# Patient Record
Sex: MALE | Race: OTHER | Employment: OTHER | URBAN - METROPOLITAN AREA
[De-identification: names, ages, dates, MRNs, and addresses within clinical notes are randomized per-mention and may not be internally consistent; named-entity substitution may affect disease eponyms.]

---

## 2017-01-19 ENCOUNTER — OFFICE VISIT (OUTPATIENT)
Dept: FAMILY MEDICINE CLINIC | Facility: CLINIC | Age: 72
End: 2017-01-19

## 2017-01-19 VITALS
OXYGEN SATURATION: 98 % | TEMPERATURE: 100 F | SYSTOLIC BLOOD PRESSURE: 110 MMHG | HEIGHT: 67 IN | HEART RATE: 71 BPM | DIASTOLIC BLOOD PRESSURE: 70 MMHG | RESPIRATION RATE: 18 BRPM | WEIGHT: 151 LBS | BODY MASS INDEX: 23.7 KG/M2

## 2017-01-19 DIAGNOSIS — J01.40 ACUTE PANSINUSITIS, RECURRENCE NOT SPECIFIED: Primary | ICD-10-CM

## 2017-01-19 PROCEDURE — 99213 OFFICE O/P EST LOW 20 MIN: CPT | Performed by: NURSE PRACTITIONER

## 2017-01-19 RX ORDER — AZITHROMYCIN 250 MG/1
TABLET, FILM COATED ORAL
Qty: 6 TABLET | Refills: 0 | Status: SHIPPED | OUTPATIENT
Start: 2017-01-19 | End: 2017-03-29

## 2017-01-20 NOTE — PATIENT INSTRUCTIONS
Humidifier in room  Sleep propped  Push fluids  Limit dairy  Mucinex as directed  Sudafed as needed  Continue Flonase and Albuterol as needed      Sinusitis (Antibiotic Treatment)    The sinuses are air-filled spaces within the bones of the face.  They co · Over-the-counter antihistamines may help if allergies contributed to your sinusitis.    · Do not use nasal rinses or irrigation during an acute sinus infection, unless told to by your health care provider.  Rinsing may spread the infection to other sinuse

## 2017-01-20 NOTE — PROGRESS NOTES
CHIEF COMPLAINT:   Patient presents with:  Cough: fatigue, bodyaches, sinus pain, congestion x 1 week       HPI:   Blanka Saravia is a 67year old male who presents for cold symptoms for  1  weeks.  Symptoms have progressed into sinus congestion and been wo Family History   Problem Relation Age of Onset   • Diabetes Mother         Smoking Status: Never Smoker                      Smokeless Status: Never Used                        Alcohol Use: No                  REVIEW OF SYSTEMS:   GENERAL:  No change in ap Risks, benefits, side effects of medication addressed and explained.     Patient Instructions     Humidifier in room  Sleep propped  Push fluids  Limit dairy  Mucinex as directed  Sudafed as needed  Continue Flonase and Albuterol as needed      Sinusitis (A · Over-the-counter decongestants may be used unless a similar medicine was prescribed. Nasal sprays work the fastest. Use one that contains phenylephrine or oxymetazoline. First blow the nose gently. Then use the spray.  Do not use these medicines more ofte © 1310-5820 The [address] [place] [address], [address]. All rights reserved. This information is not intended as a substitute for professional medical care. Always follow your healthcare professional's instructions.             The

## 2017-03-29 ENCOUNTER — OFFICE VISIT (OUTPATIENT)
Dept: FAMILY MEDICINE CLINIC | Facility: CLINIC | Age: 72
End: 2017-03-29

## 2017-03-29 VITALS
WEIGHT: 151 LBS | SYSTOLIC BLOOD PRESSURE: 114 MMHG | HEART RATE: 88 BPM | RESPIRATION RATE: 12 BRPM | DIASTOLIC BLOOD PRESSURE: 62 MMHG | BODY MASS INDEX: 24 KG/M2 | OXYGEN SATURATION: 99 % | TEMPERATURE: 98 F

## 2017-03-29 DIAGNOSIS — Z02.9 ENCOUNTERS FOR ADMINISTRATIVE PURPOSE: Primary | ICD-10-CM

## 2017-03-29 NOTE — PROGRESS NOTES
Patient complaining of left shoulder pain and decreased ROM x10-12 days. No injury, reports slept on a stiff surface and has had pain since. Denies numbness/tingling in hand or weakness.   Patient has been taking diclofenac for symptoms w/out relief and a

## 2017-03-31 ENCOUNTER — OFFICE VISIT (OUTPATIENT)
Dept: FAMILY MEDICINE CLINIC | Facility: CLINIC | Age: 72
End: 2017-03-31

## 2017-03-31 VITALS
RESPIRATION RATE: 16 BRPM | HEART RATE: 72 BPM | BODY MASS INDEX: 23.7 KG/M2 | HEIGHT: 67 IN | SYSTOLIC BLOOD PRESSURE: 128 MMHG | WEIGHT: 151 LBS | DIASTOLIC BLOOD PRESSURE: 66 MMHG

## 2017-03-31 DIAGNOSIS — M25.512 ACUTE PAIN OF LEFT SHOULDER: Primary | ICD-10-CM

## 2017-03-31 DIAGNOSIS — M54.2 NECK PAIN ON LEFT SIDE: ICD-10-CM

## 2017-03-31 PROCEDURE — 99214 OFFICE O/P EST MOD 30 MIN: CPT | Performed by: FAMILY MEDICINE

## 2017-03-31 RX ORDER — DICLOFENAC SODIUM 75 MG/1
75 TABLET, DELAYED RELEASE ORAL 2 TIMES DAILY
Qty: 60 TABLET | Refills: 2 | Status: SHIPPED | OUTPATIENT
Start: 2017-03-31 | End: 2018-08-29

## 2017-03-31 RX ORDER — CYCLOBENZAPRINE HCL 10 MG
10 TABLET ORAL 3 TIMES DAILY
Qty: 30 TABLET | Refills: 1 | Status: SHIPPED | OUTPATIENT
Start: 2017-03-31 | End: 2017-04-20

## 2017-03-31 NOTE — PROGRESS NOTES
Dennys Anderson is a 67year old male here for Patient presents with:  Shoulder Pain: Left shoulder pain on & off for years, started again a couple weeks ago       HPI:     Left shoulder pain  -has had off and on for years  -worked as a   -2 wks ago, strain  -start home exercises  -diclofenac as needed  -cyclobenzaprine prn severe pain - may cause drowsiness  -consider PT +/- injection if not improving  -f/u prn        Meds This Visit:    Signed Prescriptions Disp Refills    Diclofenac Sodium 75 MG Ora

## 2017-03-31 NOTE — PATIENT INSTRUCTIONS
-- start diclofenac 2x/day with food for next 2-3 days  -- exercises for 5-10min 2-3x/day  -- heat/ice as needed  -- if not improving, try to find a physical therapy location in [facility] and check with insurance regarding out of state coverage  -- call wit

## 2018-05-25 ENCOUNTER — PATIENT OUTREACH (OUTPATIENT)
Dept: FAMILY MEDICINE CLINIC | Facility: CLINIC | Age: 73
End: 2018-05-25

## 2018-07-25 ENCOUNTER — OFFICE VISIT (OUTPATIENT)
Dept: FAMILY MEDICINE CLINIC | Facility: CLINIC | Age: 73
End: 2018-07-25
Payer: MEDICARE

## 2018-07-25 VITALS
BODY MASS INDEX: 24.01 KG/M2 | HEART RATE: 86 BPM | WEIGHT: 153 LBS | DIASTOLIC BLOOD PRESSURE: 66 MMHG | SYSTOLIC BLOOD PRESSURE: 132 MMHG | HEIGHT: 67 IN

## 2018-07-25 DIAGNOSIS — M75.01 ADHESIVE CAPSULITIS OF RIGHT SHOULDER: ICD-10-CM

## 2018-07-25 DIAGNOSIS — G89.29 CHRONIC RIGHT SHOULDER PAIN: Primary | ICD-10-CM

## 2018-07-25 DIAGNOSIS — M25.511 CHRONIC RIGHT SHOULDER PAIN: Primary | ICD-10-CM

## 2018-07-25 PROCEDURE — 99215 OFFICE O/P EST HI 40 MIN: CPT | Performed by: FAMILY MEDICINE

## 2018-07-25 PROCEDURE — 20610 DRAIN/INJ JOINT/BURSA W/O US: CPT | Performed by: FAMILY MEDICINE

## 2018-07-25 RX ORDER — NAPROXEN 500 MG/1
500 TABLET ORAL 2 TIMES DAILY WITH MEALS
Qty: 60 TABLET | Refills: 2 | Status: SHIPPED | OUTPATIENT
Start: 2018-07-25 | End: 2018-08-29

## 2018-07-25 RX ORDER — TRIAMCINOLONE ACETONIDE 40 MG/ML
40 INJECTION, SUSPENSION INTRA-ARTICULAR; INTRAMUSCULAR ONCE
Status: COMPLETED | OUTPATIENT
Start: 2018-07-25 | End: 2018-07-25

## 2018-07-25 RX ORDER — DICLOFENAC SODIUM 75 MG/1
75 TABLET, DELAYED RELEASE ORAL 2 TIMES DAILY
Qty: 60 TABLET | Refills: 2 | Status: CANCELLED | OUTPATIENT
Start: 2018-07-25

## 2018-07-25 NOTE — PATIENT INSTRUCTIONS
-- naproxen 2x/day as needed, take with food  -- can try OTC lidocaine pain patch as needed  -- ice as needed  -- exercises  -- call to schedule PT next week  -- followup in 4 wks, sooner if needed

## 2018-07-25 NOTE — PROGRESS NOTES
Dunia Pettit is a 75year old male here for Patient presents with:  Shoulder Pain: Right shoulder consistant pain x 1 year, worse the last month and a half      HPI:       1. Chronic right shoulder pain  2.  Adhesive capsulitis of right shoulder  -started can test and impingement testing  Psych: normal affect     ASSESSMENT/PLAN:     1. Chronic right shoulder pain  2.  Adhesive capsulitis of right shoulder  -likely rotator cuff pathology with adhesive capsulitis  -worsening  -counseled at length regarding ca

## 2018-08-01 ENCOUNTER — OFFICE VISIT (OUTPATIENT)
Dept: PHYSICAL THERAPY | Age: 73
End: 2018-08-01
Attending: FAMILY MEDICINE
Payer: MEDICARE

## 2018-08-01 DIAGNOSIS — M25.511 CHRONIC RIGHT SHOULDER PAIN: ICD-10-CM

## 2018-08-01 DIAGNOSIS — M75.01 ADHESIVE CAPSULITIS OF RIGHT SHOULDER: ICD-10-CM

## 2018-08-01 DIAGNOSIS — G89.29 CHRONIC RIGHT SHOULDER PAIN: ICD-10-CM

## 2018-08-01 PROCEDURE — 97161 PT EVAL LOW COMPLEX 20 MIN: CPT

## 2018-08-01 PROCEDURE — 97110 THERAPEUTIC EXERCISES: CPT

## 2018-08-01 NOTE — PROGRESS NOTES
UPPER EXTREMITY EVALUATION:   Referring Physician: Dr. Ashlee Jackson  Diagnosis: Chronic right shoulder pain and adhesive capsulitis.    Date of Service: 8/1/2018     PATIENT [address] [place] [address] is a 75year old y/o male who presents to therapy today with comp understanding and agreement. Pt performs HEP without any increases in pain but needs VC on proper control and following he performs correctly. It is medically necessary for pt to continue PT to reach functional goals.        Precautions:  None  OBJECTIVE: agreed to actively participate in planning and for this course of care. Thank you for your referral. Please co-sign or sign and return this letter via fax as soon as possible to [phone].  If you have any questions, please contact me at Dept: [phone]

## 2018-08-06 ENCOUNTER — OFFICE VISIT (OUTPATIENT)
Dept: PHYSICAL THERAPY | Age: 73
End: 2018-08-06
Attending: FAMILY MEDICINE
Payer: MEDICARE

## 2018-08-06 PROCEDURE — 97110 THERAPEUTIC EXERCISES: CPT

## 2018-08-06 PROCEDURE — 97140 MANUAL THERAPY 1/> REGIONS: CPT

## 2018-08-06 PROCEDURE — 97112 NEUROMUSCULAR REEDUCATION: CPT

## 2018-08-06 NOTE — PROGRESS NOTES
Dx: Chronic right shoulder pain and adhesive capsulitis.            Authorized # of Visits:  medicare         Next MD visit: none scheduled  Fall Risk: standard         Precautions: n/a             Subjective: Pt states his shoulder is still feeling a lot b

## 2018-08-09 ENCOUNTER — OFFICE VISIT (OUTPATIENT)
Dept: PHYSICAL THERAPY | Age: 73
End: 2018-08-09
Attending: FAMILY MEDICINE
Payer: MEDICARE

## 2018-08-09 PROCEDURE — 97110 THERAPEUTIC EXERCISES: CPT

## 2018-08-09 PROCEDURE — 97140 MANUAL THERAPY 1/> REGIONS: CPT

## 2018-08-09 PROCEDURE — 97112 NEUROMUSCULAR REEDUCATION: CPT

## 2018-08-09 NOTE — PROGRESS NOTES
Dx: Chronic right shoulder pain and adhesive capsulitis.            Authorized # of Visits:  medicare         Next MD visit: none scheduled  Fall Risk: standard         Precautions: n/a             Subjective: Pt states his shoulder is better when he is sle post joint mobs grade 1-2 Manual  Subscap, UT, post RTC STM  GH inf and post joint mobs grade 1-2  Levator MET          Charges: manual x1, therex x1, neuro x1       Total Timed Treatment: 45 min  Total Treatment Time: 45 min

## 2018-08-16 ENCOUNTER — OFFICE VISIT (OUTPATIENT)
Dept: PHYSICAL THERAPY | Age: 73
End: 2018-08-16
Attending: FAMILY MEDICINE
Payer: MEDICARE

## 2018-08-16 PROCEDURE — 97110 THERAPEUTIC EXERCISES: CPT

## 2018-08-16 PROCEDURE — 97140 MANUAL THERAPY 1/> REGIONS: CPT

## 2018-08-16 PROCEDURE — 97112 NEUROMUSCULAR REEDUCATION: CPT

## 2018-08-16 NOTE — PROGRESS NOTES
Dx: Chronic right shoulder pain and adhesive capsulitis.            Authorized # of Visits:  medicare         Next MD visit: none scheduled  Fall Risk: standard         Precautions: n/a             Subjective: Pt states his shoulder no longer bothers him wh trap lift off's 20x       Prone scap squeeze 5sx10 horiz abd RTB 15x horiz abd RTB 15x       RTB rows 2x10 RTB rows 2x10 RTT rows 2x10       Manual  Subscap, UT, post RTC STM  GH inf and post joint mobs grade 1-2 Manual  Subscap, UT, post RTC STM  GH inf a

## 2018-08-20 ENCOUNTER — APPOINTMENT (OUTPATIENT)
Dept: PHYSICAL THERAPY | Age: 73
End: 2018-08-20
Attending: FAMILY MEDICINE
Payer: MEDICARE

## 2018-08-21 ENCOUNTER — OFFICE VISIT (OUTPATIENT)
Dept: PHYSICAL THERAPY | Age: 73
End: 2018-08-21
Attending: FAMILY MEDICINE
Payer: MEDICARE

## 2018-08-21 PROCEDURE — 97112 NEUROMUSCULAR REEDUCATION: CPT

## 2018-08-21 PROCEDURE — 97110 THERAPEUTIC EXERCISES: CPT

## 2018-08-21 PROCEDURE — 97140 MANUAL THERAPY 1/> REGIONS: CPT

## 2018-08-21 NOTE — PROGRESS NOTES
Dx: Chronic right shoulder pain and adhesive capsulitis.            Authorized # of Visits:  medicare         Next MD visit: none scheduled  Fall Risk: standard         Precautions: n/a             Subjective: Pt states his shoulder is feeling better since 20x ea YTB IR and ER 20x ea RTB IR and ER 20x ea      YTB scap squeeze 2x10 YTB scap squeeze 2x10 Prone T's 2x10 Prone T's 2x10      SL ER 1# 2x10 YTB low trap lift off's 20x YTB low trap lift off's 20x RTB low trap lift off's 20x      Prone scap squeeze 5

## 2018-08-23 ENCOUNTER — OFFICE VISIT (OUTPATIENT)
Dept: PHYSICAL THERAPY | Age: 73
End: 2018-08-23
Attending: FAMILY MEDICINE
Payer: MEDICARE

## 2018-08-23 PROCEDURE — 97112 NEUROMUSCULAR REEDUCATION: CPT

## 2018-08-23 PROCEDURE — 97110 THERAPEUTIC EXERCISES: CPT

## 2018-08-23 PROCEDURE — 97140 MANUAL THERAPY 1/> REGIONS: CPT

## 2018-08-23 NOTE — PROGRESS NOTES
Dx: Chronic right shoulder pain and adhesive capsulitis.            Authorized # of Visits:  medicare         Next MD visit: none scheduled  Fall Risk: standard         Precautions: n/a             Subjective: Pt states his right shoulder is feeling good bu YTB scap squeeze 2x10 YTB scap squeeze 2x10 Prone T's 2x10 Prone T's 2x10 Prone T's 2x10     SL ER 1# 2x10 YTB low trap lift off's 20x YTB low trap lift off's 20x RTB low trap lift off's 20x RTB low trap lift off's 20x     Prone scap squeeze 5sx10 horiz

## 2018-08-27 ENCOUNTER — APPOINTMENT (OUTPATIENT)
Dept: PHYSICAL THERAPY | Age: 73
End: 2018-08-27
Attending: FAMILY MEDICINE
Payer: MEDICARE

## 2018-08-28 ENCOUNTER — OFFICE VISIT (OUTPATIENT)
Dept: PHYSICAL THERAPY | Age: 73
End: 2018-08-28
Attending: FAMILY MEDICINE
Payer: MEDICARE

## 2018-08-28 PROCEDURE — 97110 THERAPEUTIC EXERCISES: CPT

## 2018-08-28 NOTE — PROGRESS NOTES
UPPER EXTREMITY PROGRESS NOTE:   Referring Physician: Dr. Brice Huber  Diagnosis: Chronic right shoulder pain and adhesive capsulitis.    Date of Service: 8/28/2018     PATIENT SUMMARY   Brittani Ghosh is a 75year old y/o male who presents to therapy today with 4/5   Low trap: R 4-/5  ; L unable to test due to pain       Today’s Treatment and Response:   HEP: Issued and Handouts Given RTB ER 20x ea, YTB standin scaption 20x, prone T's 2x10, standing Y's RTB 2x10  Charges: therex x2    Total Timed Treatment: 30 mi

## 2018-08-29 ENCOUNTER — OFFICE VISIT (OUTPATIENT)
Dept: FAMILY MEDICINE CLINIC | Facility: CLINIC | Age: 73
End: 2018-08-29
Payer: MEDICARE

## 2018-08-29 VITALS
BODY MASS INDEX: 23.7 KG/M2 | WEIGHT: 151 LBS | SYSTOLIC BLOOD PRESSURE: 134 MMHG | HEIGHT: 67 IN | DIASTOLIC BLOOD PRESSURE: 78 MMHG | HEART RATE: 98 BPM

## 2018-08-29 DIAGNOSIS — G72.9 MYOPATHY: ICD-10-CM

## 2018-08-29 DIAGNOSIS — G89.29 CHRONIC PAIN OF BOTH SHOULDERS: Primary | ICD-10-CM

## 2018-08-29 DIAGNOSIS — R79.89 OTHER SPECIFIED ABNORMAL FINDINGS OF BLOOD CHEMISTRY: ICD-10-CM

## 2018-08-29 DIAGNOSIS — M25.511 CHRONIC PAIN OF BOTH SHOULDERS: Primary | ICD-10-CM

## 2018-08-29 DIAGNOSIS — M25.512 CHRONIC PAIN OF BOTH SHOULDERS: Primary | ICD-10-CM

## 2018-08-29 DIAGNOSIS — G62.9 NEUROPATHY: ICD-10-CM

## 2018-08-29 PROCEDURE — 99214 OFFICE O/P EST MOD 30 MIN: CPT | Performed by: FAMILY MEDICINE

## 2018-08-29 RX ORDER — DICLOFENAC SODIUM 75 MG/1
75 TABLET, DELAYED RELEASE ORAL 2 TIMES DAILY
Qty: 60 TABLET | Refills: 2 | Status: SHIPPED | OUTPATIENT
Start: 2018-08-29 | End: 2018-11-01

## 2018-08-29 NOTE — PATIENT INSTRUCTIONS
-- -- come in for fasting bloodwork anytime that you are able to (8-10h no food, only water; lab here is open M-F, 8am-12)  -- we will call with results about 5-7 days after bloodwork is completed    -- continue exercises, diclofenac as needed    -- fo

## 2018-08-30 ENCOUNTER — APPOINTMENT (OUTPATIENT)
Dept: PHYSICAL THERAPY | Age: 73
End: 2018-08-30
Payer: MEDICARE

## 2018-08-30 NOTE — PROGRESS NOTES
Georgi Garza is a 75year old male here for Patient presents with:  Shoulder Pain: Right shoulder pain follow-up. The pain is much better after physical therapy. His pain started again after vacuming. Naproxen is not helping.  Left shoulder pain x 1-2 we wheezing  CV: RRR, no murmurs  Abd: soft, ND, NT, +BS  Ext: full ROM, + empty can test on left  Psych: normal affect     ASSESSMENT/PLAN:     1.  Chronic pain of both shoulders  -encouragd stretching exercises of both shoulders daily  -consider PT on left s

## 2018-08-31 ENCOUNTER — LAB ENCOUNTER (OUTPATIENT)
Dept: LAB | Age: 73
End: 2018-08-31
Attending: FAMILY MEDICINE
Payer: MEDICARE

## 2018-08-31 DIAGNOSIS — G72.9 MYOPATHY: ICD-10-CM

## 2018-08-31 DIAGNOSIS — G62.9 NEUROPATHY: ICD-10-CM

## 2018-08-31 DIAGNOSIS — R79.89 OTHER SPECIFIED ABNORMAL FINDINGS OF BLOOD CHEMISTRY: ICD-10-CM

## 2018-08-31 LAB
ALBUMIN SERPL-MCNC: 3.8 G/DL (ref 3.5–4.8)
ALBUMIN/GLOB SERPL: 1.1 {RATIO} (ref 1–2)
ALP LIVER SERPL-CCNC: 77 U/L (ref 45–117)
ALT SERPL-CCNC: 28 U/L (ref 17–63)
ANION GAP SERPL CALC-SCNC: 10 MMOL/L (ref 0–18)
AST SERPL-CCNC: 21 U/L (ref 15–41)
BILIRUB SERPL-MCNC: 1.1 MG/DL (ref 0.1–2)
BUN BLD-MCNC: 15 MG/DL (ref 8–20)
BUN/CREAT SERPL: 14.6 (ref 10–20)
CALCIUM BLD-MCNC: 9.1 MG/DL (ref 8.3–10.3)
CHLORIDE SERPL-SCNC: 102 MMOL/L (ref 101–111)
CHOLEST SMN-MCNC: 243 MG/DL (ref ?–200)
CO2 SERPL-SCNC: 26 MMOL/L (ref 22–32)
CREAT BLD-MCNC: 1.03 MG/DL (ref 0.7–1.3)
GLOBULIN PLAS-MCNC: 3.6 G/DL (ref 2.5–4)
GLUCOSE BLD-MCNC: 104 MG/DL (ref 70–99)
HDLC SERPL-MCNC: 31 MG/DL (ref 40–59)
LDLC SERPL CALC-MCNC: 165 MG/DL (ref ?–100)
M PROTEIN MFR SERPL ELPH: 7.4 G/DL (ref 6.1–8.3)
NONHDLC SERPL-MCNC: 212 MG/DL (ref ?–130)
OSMOLALITY SERPL CALC.SUM OF ELEC: 287 MOSM/KG (ref 275–295)
POTASSIUM SERPL-SCNC: 4.1 MMOL/L (ref 3.6–5.1)
SODIUM SERPL-SCNC: 138 MMOL/L (ref 136–144)
TRIGL SERPL-MCNC: 236 MG/DL (ref 30–149)
TSI SER-ACNC: 2.71 MIU/ML (ref 0.35–5.5)
VLDLC SERPL CALC-MCNC: 47 MG/DL (ref 0–30)

## 2018-08-31 PROCEDURE — 84443 ASSAY THYROID STIM HORMONE: CPT

## 2018-08-31 PROCEDURE — 80061 LIPID PANEL: CPT

## 2018-08-31 PROCEDURE — 80053 COMPREHEN METABOLIC PANEL: CPT

## 2018-08-31 PROCEDURE — 36415 COLL VENOUS BLD VENIPUNCTURE: CPT

## 2018-08-31 NOTE — PROGRESS NOTES
cholesterol and sugar mildly elevated, increase exercise, reduce fats, sugars and carbs; more fruits and veggies  We will further discuss at upcoming appt  -rest of labs look ok

## 2018-09-25 ENCOUNTER — TELEPHONE (OUTPATIENT)
Dept: FAMILY MEDICINE CLINIC | Facility: CLINIC | Age: 73
End: 2018-09-25

## 2018-10-03 ENCOUNTER — OFFICE VISIT (OUTPATIENT)
Dept: FAMILY MEDICINE CLINIC | Facility: CLINIC | Age: 73
End: 2018-10-03
Payer: MEDICARE

## 2018-10-03 VITALS
BODY MASS INDEX: 23.7 KG/M2 | WEIGHT: 151 LBS | HEIGHT: 67 IN | HEART RATE: 92 BPM | DIASTOLIC BLOOD PRESSURE: 70 MMHG | SYSTOLIC BLOOD PRESSURE: 130 MMHG

## 2018-10-03 DIAGNOSIS — G89.29 CHRONIC PAIN OF BOTH SHOULDERS: ICD-10-CM

## 2018-10-03 DIAGNOSIS — G89.29 CHRONIC LEFT SHOULDER PAIN: Primary | ICD-10-CM

## 2018-10-03 DIAGNOSIS — M25.60 MORNING STIFFNESS OF JOINTS: ICD-10-CM

## 2018-10-03 DIAGNOSIS — M25.512 CHRONIC LEFT SHOULDER PAIN: Primary | ICD-10-CM

## 2018-10-03 DIAGNOSIS — G89.29 CHRONIC RIGHT SHOULDER PAIN: ICD-10-CM

## 2018-10-03 DIAGNOSIS — M25.511 CHRONIC RIGHT SHOULDER PAIN: ICD-10-CM

## 2018-10-03 DIAGNOSIS — G62.9 NEUROPATHY: ICD-10-CM

## 2018-10-03 DIAGNOSIS — M25.511 CHRONIC PAIN OF BOTH SHOULDERS: ICD-10-CM

## 2018-10-03 DIAGNOSIS — M25.512 CHRONIC PAIN OF BOTH SHOULDERS: ICD-10-CM

## 2018-10-03 PROCEDURE — 99215 OFFICE O/P EST HI 40 MIN: CPT | Performed by: FAMILY MEDICINE

## 2018-10-03 RX ORDER — GABAPENTIN 100 MG/1
100 CAPSULE ORAL NIGHTLY
Qty: 60 CAPSULE | Refills: 0 | Status: SHIPPED | OUTPATIENT
Start: 2018-10-03 | End: 2018-11-28

## 2018-10-03 NOTE — PROGRESS NOTES
Misael Hand is a 75year old male here for Patient presents with:  Shoulder Pain: Bilateral shoulder pain is the same      HPI:       1. Chronic left shoulder pain  2. Chronic right shoulder pain  3. Chronic pain of both shoulders  4.  Morning stiffness Gen: NAD, alert and oriented x 3  HEENT: NCAT, pupils equal and round  Pulm: CTAB, no wheezing  CV: RRR, no murmurs  Abd: soft, ND, NT, +BS  Ext: full ROM  Psych: normal affect     ASSESSMENT/PLAN:     1. Chronic left shoulder pain  2.  Chronic right sh

## 2018-10-03 NOTE — PATIENT INSTRUCTIONS
-- call to schedule appt for xrays of shoulders  -- can get bloodwork done on the same day  -- I will let you know what the plan is based on the results of the tests    -- start gabapentin 100mg nightly - can increase to 200mg nightly if needed - this sh

## 2018-10-05 ENCOUNTER — HOSPITAL ENCOUNTER (OUTPATIENT)
Dept: GENERAL RADIOLOGY | Age: 73
Discharge: HOME | End: 2018-10-05
Attending: FAMILY MEDICINE
Payer: MEDICARE

## 2018-10-05 ENCOUNTER — LAB ENCOUNTER (OUTPATIENT)
Dept: LAB | Facility: HOSPITAL | Age: 73
End: 2018-10-05
Attending: FAMILY MEDICINE
Payer: MEDICARE

## 2018-10-05 DIAGNOSIS — G89.29 CHRONIC LEFT SHOULDER PAIN: ICD-10-CM

## 2018-10-05 DIAGNOSIS — M25.512 CHRONIC PAIN OF BOTH SHOULDERS: ICD-10-CM

## 2018-10-05 DIAGNOSIS — M25.60 MORNING STIFFNESS OF JOINTS: ICD-10-CM

## 2018-10-05 DIAGNOSIS — M25.511 CHRONIC PAIN OF BOTH SHOULDERS: ICD-10-CM

## 2018-10-05 DIAGNOSIS — G89.29 CHRONIC RIGHT SHOULDER PAIN: ICD-10-CM

## 2018-10-05 DIAGNOSIS — M25.512 CHRONIC LEFT SHOULDER PAIN: ICD-10-CM

## 2018-10-05 DIAGNOSIS — M25.511 CHRONIC RIGHT SHOULDER PAIN: ICD-10-CM

## 2018-10-05 DIAGNOSIS — G89.29 CHRONIC PAIN OF BOTH SHOULDERS: ICD-10-CM

## 2018-10-05 PROCEDURE — 73030 X-RAY EXAM OF SHOULDER: CPT | Performed by: FAMILY MEDICINE

## 2018-10-05 PROCEDURE — 86200 CCP ANTIBODY: CPT

## 2018-10-05 PROCEDURE — 86235 NUCLEAR ANTIGEN ANTIBODY: CPT

## 2018-10-05 PROCEDURE — 86038 ANTINUCLEAR ANTIBODIES: CPT

## 2018-10-05 PROCEDURE — 86225 DNA ANTIBODY NATIVE: CPT

## 2018-10-05 PROCEDURE — 86140 C-REACTIVE PROTEIN: CPT

## 2018-10-05 PROCEDURE — 84550 ASSAY OF BLOOD/URIC ACID: CPT

## 2018-10-05 PROCEDURE — 85652 RBC SED RATE AUTOMATED: CPT

## 2018-10-05 PROCEDURE — 36415 COLL VENOUS BLD VENIPUNCTURE: CPT

## 2018-10-05 PROCEDURE — 86431 RHEUMATOID FACTOR QUANT: CPT

## 2018-10-09 ENCOUNTER — TELEPHONE (OUTPATIENT)
Dept: FAMILY MEDICINE CLINIC | Facility: CLINIC | Age: 73
End: 2018-10-09

## 2018-10-09 ENCOUNTER — OFFICE VISIT (OUTPATIENT)
Dept: FAMILY MEDICINE CLINIC | Facility: CLINIC | Age: 73
End: 2018-10-09

## 2018-10-09 DIAGNOSIS — M25.60 MORNING STIFFNESS OF JOINTS: ICD-10-CM

## 2018-10-09 DIAGNOSIS — G89.29 CHRONIC PAIN OF BOTH SHOULDERS: Primary | ICD-10-CM

## 2018-10-09 DIAGNOSIS — M19.011 ARTHRITIS OF RIGHT ACROMIOCLAVICULAR JOINT: ICD-10-CM

## 2018-10-09 DIAGNOSIS — M25.512 CHRONIC PAIN OF BOTH SHOULDERS: Primary | ICD-10-CM

## 2018-10-09 DIAGNOSIS — Z02.9 ADMINISTRATIVE ENCOUNTER: Primary | ICD-10-CM

## 2018-10-09 DIAGNOSIS — M25.511 CHRONIC PAIN OF BOTH SHOULDERS: Primary | ICD-10-CM

## 2018-10-09 DIAGNOSIS — R76.8 POSITIVE ANA (ANTINUCLEAR ANTIBODY): ICD-10-CM

## 2018-10-19 ENCOUNTER — OFFICE VISIT (OUTPATIENT)
Dept: RHEUMATOLOGY | Facility: CLINIC | Age: 73
End: 2018-10-19
Payer: MEDICARE

## 2018-10-19 VITALS
SYSTOLIC BLOOD PRESSURE: 114 MMHG | WEIGHT: 152.88 LBS | HEART RATE: 89 BPM | BODY MASS INDEX: 24.57 KG/M2 | DIASTOLIC BLOOD PRESSURE: 70 MMHG | HEIGHT: 66 IN

## 2018-10-19 DIAGNOSIS — R76.8 POSITIVE ANA (ANTINUCLEAR ANTIBODY): Primary | ICD-10-CM

## 2018-10-19 DIAGNOSIS — G89.29 CHRONIC RIGHT SHOULDER PAIN: ICD-10-CM

## 2018-10-19 DIAGNOSIS — M25.511 CHRONIC RIGHT SHOULDER PAIN: ICD-10-CM

## 2018-10-19 PROCEDURE — 99204 OFFICE O/P NEW MOD 45 MIN: CPT | Performed by: INTERNAL MEDICINE

## 2018-10-19 PROCEDURE — G0463 HOSPITAL OUTPT CLINIC VISIT: HCPCS | Performed by: INTERNAL MEDICINE

## 2018-10-19 NOTE — PROGRESS NOTES
Francisco Ortiz is a 75year old male who presents for Patient presents with:  Shoulder Pain: right shoulder  . HPI:     I had the pleasure of seeing Francisco Ortiz on 10/19/2018 for evaluation of R shoulder pain and +SIS .   Patient was referred by Dr. Anthony Castillo Medications:  gabapentin 100 MG Oral Cap Take 1 capsule (100 mg total) by mouth nightly. May increase to 2 caps nightly if needed Disp: 60 capsule Rfl: 0   Diclofenac Sodium 75 MG Oral Tab EC Take 1 tablet (75 mg total) by mouth 2 (two) times daily.  As nee breathing  ABDOMEN:  soft NT/ND, +BS, no HSM  SKIN: No rashes or skin lesions.  No nail findings  MSK:  Cervical spine: FROM  Hands: no synovitis in DIP, PIP and MCP, strong full fists, +[place]berden nodes b/l  Wrist: FROM, no pain or swelling or warmth on palp with steroid injection and physical therapy but now has come back. He has been taking diclofenac twice a day which helps with his pain.   On examination he has full range of motion with + Neer's, Grove, crossover and jobs test.  X-ray shows degenerative

## 2018-10-19 NOTE — PATIENT INSTRUCTIONS
1. You were seen today for L shoulder pain likely due to [facility] joint arthritis with osteophyte formation. You received a steroid injection and did PT which initially helped but now continues to pain in R shoulder  2. Continue to take diclofenac 2x a day  3.  Elisha Cortes

## 2018-10-27 ENCOUNTER — PATIENT MESSAGE (OUTPATIENT)
Dept: FAMILY MEDICINE CLINIC | Facility: CLINIC | Age: 73
End: 2018-10-27

## 2018-10-27 DIAGNOSIS — M25.512 CHRONIC PAIN OF BOTH SHOULDERS: Primary | ICD-10-CM

## 2018-10-27 DIAGNOSIS — M25.511 CHRONIC PAIN OF BOTH SHOULDERS: Primary | ICD-10-CM

## 2018-10-27 DIAGNOSIS — G89.29 CHRONIC PAIN OF BOTH SHOULDERS: Primary | ICD-10-CM

## 2018-10-29 NOTE — TELEPHONE ENCOUNTER
From: [address]  To: Stephania Pettit MD  Sent: 10/27/2018 12:18 PM CDT  Subject: Prescription Question    Hi Dr. Laly Weinberg,    My father Shabnam Robledo still getting a lot of pain in both of his shoulders.  As you mentioned in the last appointment to schedule a vi

## 2018-10-30 ENCOUNTER — OFFICE VISIT (OUTPATIENT)
Dept: ORTHOPEDICS CLINIC | Facility: CLINIC | Age: 73
End: 2018-10-30
Payer: MEDICARE

## 2018-10-30 ENCOUNTER — HOSPITAL ENCOUNTER (OUTPATIENT)
Dept: GENERAL RADIOLOGY | Facility: HOSPITAL | Age: 73
Discharge: HOME | End: 2018-10-30
Attending: ORTHOPAEDIC SURGERY | Admitting: ORTHOPAEDIC SURGERY
Payer: MEDICARE

## 2018-10-30 DIAGNOSIS — M79.602 BILATERAL ARM PAIN: ICD-10-CM

## 2018-10-30 DIAGNOSIS — M25.512 ACUTE PAIN OF LEFT SHOULDER: ICD-10-CM

## 2018-10-30 DIAGNOSIS — M79.601 BILATERAL ARM PAIN: ICD-10-CM

## 2018-10-30 DIAGNOSIS — M25.511 ACUTE PAIN OF RIGHT SHOULDER: Primary | ICD-10-CM

## 2018-10-30 DIAGNOSIS — M50.30 DDD (DEGENERATIVE DISC DISEASE), CERVICAL: ICD-10-CM

## 2018-10-30 PROCEDURE — 99203 OFFICE O/P NEW LOW 30 MIN: CPT | Performed by: ORTHOPAEDIC SURGERY

## 2018-10-30 PROCEDURE — 72040 X-RAY EXAM NECK SPINE 2-3 VW: CPT | Performed by: ORTHOPAEDIC SURGERY

## 2018-10-30 PROCEDURE — G0463 HOSPITAL OUTPT CLINIC VISIT: HCPCS | Performed by: ORTHOPAEDIC SURGERY

## 2018-10-30 NOTE — PROGRESS NOTES
10/30/2018  [address]  1/15/1945  75year old   male  Rojelio Arriola MD    HPI:   Patient presents with:  Shoulder Pain: Bilateral  - here with his son who translates for him - onset long time ago - no injury - has x-rays of the shoulders in the REVIEW OF SYSTEMS:   A 12 point review of systems was performed as documented on the intake form and reviewed by me today with pertinent positives and negatives listed in the HPI.     EXAM:     The patient is awake and oriented x 3 and overall well appe physical therapy which has not been effective at reducing his pain. Therefore, the patient would benefit from obtaining an MRI of the right shoulder to look for any intra-articular pathology.   He will follow-up after this test been performs we may obtain

## 2018-11-02 RX ORDER — DICLOFENAC SODIUM 75 MG/1
TABLET, DELAYED RELEASE ORAL
Qty: 60 TABLET | Refills: 0 | Status: SHIPPED | OUTPATIENT
Start: 2018-11-02 | End: 2019-06-12

## 2018-11-03 ENCOUNTER — HOSPITAL ENCOUNTER (OUTPATIENT)
Dept: MRI IMAGING | Facility: HOSPITAL | Age: 73
Discharge: HOME | End: 2018-11-03
Attending: ORTHOPAEDIC SURGERY
Payer: MEDICARE

## 2018-11-03 DIAGNOSIS — M25.511 ACUTE PAIN OF RIGHT SHOULDER: ICD-10-CM

## 2018-11-03 PROCEDURE — 73221 MRI JOINT UPR EXTREM W/O DYE: CPT | Performed by: ORTHOPAEDIC SURGERY

## 2018-11-06 ENCOUNTER — OFFICE VISIT (OUTPATIENT)
Dept: ORTHOPEDICS CLINIC | Facility: CLINIC | Age: 73
End: 2018-11-06
Payer: MEDICARE

## 2018-11-06 DIAGNOSIS — M75.121 COMPLETE TEAR OF RIGHT ROTATOR CUFF: Primary | ICD-10-CM

## 2018-11-06 PROCEDURE — G0463 HOSPITAL OUTPT CLINIC VISIT: HCPCS | Performed by: ORTHOPAEDIC SURGERY

## 2018-11-06 PROCEDURE — 99213 OFFICE O/P EST LOW 20 MIN: CPT | Performed by: ORTHOPAEDIC SURGERY

## 2018-11-07 NOTE — PROGRESS NOTES
This is a pleasant 67-year-old male that comes in today for repeat evaluation of the right shoulder. He recently had an MRI of the right shoulder comes in today to discuss results.   Patient reports that he is continuing to take the diclofenac twice daily neurovascular injury, need for further surgery, development of deep vein thrombosis, pulmonary emboli, and anesthesia problems. The patient understands the risks and wishes to proceed with surgery.

## 2018-11-29 RX ORDER — GABAPENTIN 100 MG/1
CAPSULE ORAL
Qty: 60 CAPSULE | Refills: 0 | Status: SHIPPED | OUTPATIENT
Start: 2018-11-29 | End: 2019-02-05

## 2019-02-05 RX ORDER — GABAPENTIN 100 MG/1
CAPSULE ORAL
Qty: 60 CAPSULE | Refills: 0 | Status: SHIPPED | OUTPATIENT
Start: 2019-02-05 | End: 2019-10-09

## 2019-02-13 ENCOUNTER — TELEPHONE (OUTPATIENT)
Dept: FAMILY MEDICINE CLINIC | Facility: CLINIC | Age: 74
End: 2019-02-13

## 2019-02-13 NOTE — TELEPHONE ENCOUNTER
Left message to schedule appointment for the [facility] Annual Wellness Visit.

## 2019-03-12 ENCOUNTER — TELEPHONE (OUTPATIENT)
Dept: FAMILY MEDICINE CLINIC | Facility: CLINIC | Age: 74
End: 2019-03-12

## 2019-06-12 ENCOUNTER — TELEPHONE (OUTPATIENT)
Dept: FAMILY MEDICINE CLINIC | Facility: CLINIC | Age: 74
End: 2019-06-12

## 2019-06-12 RX ORDER — DICLOFENAC SODIUM 75 MG/1
TABLET, DELAYED RELEASE ORAL
Qty: 60 TABLET | Refills: 1 | Status: SHIPPED | OUTPATIENT
Start: 2019-06-12 | End: 2019-10-09

## 2019-06-12 NOTE — TELEPHONE ENCOUNTER
Pt son Alphonso Moreau requesting refill on DICLOFENAC SODIUM 75 MG Oral Tab EC would like sent to [facility] in [place] on [address].

## 2019-06-12 NOTE — TELEPHONE ENCOUNTER
Spoke to patients son Crista Walters. States his father continues to have left shoulder pain. Does not need to take medication every day but still requires it. Hasnt had refill since November. Ok to refill?

## 2019-09-19 ENCOUNTER — HOSPITAL ENCOUNTER (OUTPATIENT)
Age: 74
Discharge: HOME | End: 2019-09-19
Attending: EMERGENCY MEDICINE
Payer: MEDICARE

## 2019-09-19 ENCOUNTER — APPOINTMENT (OUTPATIENT)
Dept: GENERAL RADIOLOGY | Age: 74
End: 2019-09-19
Attending: EMERGENCY MEDICINE
Payer: MEDICARE

## 2019-09-19 VITALS
RESPIRATION RATE: 18 BRPM | TEMPERATURE: 98 F | HEART RATE: 74 BPM | DIASTOLIC BLOOD PRESSURE: 75 MMHG | SYSTOLIC BLOOD PRESSURE: 149 MMHG | OXYGEN SATURATION: 97 %

## 2019-09-19 DIAGNOSIS — M23.92 INTERNAL DERANGEMENT OF LEFT KNEE: Primary | ICD-10-CM

## 2019-09-19 PROCEDURE — 99203 OFFICE O/P NEW LOW 30 MIN: CPT

## 2019-09-19 PROCEDURE — 73560 X-RAY EXAM OF KNEE 1 OR 2: CPT | Performed by: EMERGENCY MEDICINE

## 2019-09-19 PROCEDURE — 99213 OFFICE O/P EST LOW 20 MIN: CPT

## 2019-09-19 RX ORDER — MELOXICAM 15 MG/1
15 TABLET ORAL DAILY
Qty: 14 TABLET | Refills: 1 | Status: SHIPPED | OUTPATIENT
Start: 2019-09-19 | End: 2019-10-09

## 2019-09-20 NOTE — ED INITIAL ASSESSMENT (HPI)
Right knee pain x 2 weeks. Patient states that he was walking down stairs while visiting his daughter in [place] when this occurred. Complains of anterior and posterior knee pain. Patient denies calf pain or shortness of breath.  Patient did fly home 2 day

## 2019-09-20 NOTE — ED PROVIDER NOTES
Patient Seen in: [facility]      History   Patient presents with:  Lower Extremity Injury (musculoskeletal)    Stated Complaint: knee pain x 9 days    HPI    This is a very pleasant 65-year-old male who presents with left knee p +2/4 DP PT pulses.     ED Course   Labs Reviewed - No data to display       Xr Knee (1 Or 2 Views), Left (cpt=73560)    Result Date: 9/19/2019  PROCEDURE:  XR KNEE (1 OR 2 VIEWS), LEFT (CPT=73560)  COMPARISON:  [facility] , XR KNEE (1 OR 2 VIEWS), LEFT (CPT=735 EpicACT:ED_HEARTSCORE_SF_POPUP,RunParamsURLEncoded:701%7C

## 2019-10-09 ENCOUNTER — OFFICE VISIT (OUTPATIENT)
Dept: FAMILY MEDICINE CLINIC | Facility: CLINIC | Age: 74
End: 2019-10-09
Payer: MEDICARE

## 2019-10-09 ENCOUNTER — APPOINTMENT (OUTPATIENT)
Dept: LAB | Age: 74
End: 2019-10-09
Attending: FAMILY MEDICINE
Payer: MEDICARE

## 2019-10-09 VITALS
WEIGHT: 153 LBS | HEART RATE: 70 BPM | TEMPERATURE: 99 F | DIASTOLIC BLOOD PRESSURE: 70 MMHG | SYSTOLIC BLOOD PRESSURE: 128 MMHG | HEIGHT: 66.34 IN | BODY MASS INDEX: 24.3 KG/M2

## 2019-10-09 DIAGNOSIS — M25.562 ACUTE PAIN OF LEFT KNEE: ICD-10-CM

## 2019-10-09 DIAGNOSIS — Z00.00 ROUTINE GENERAL MEDICAL EXAMINATION AT A HEALTH CARE FACILITY: Primary | ICD-10-CM

## 2019-10-09 DIAGNOSIS — Z00.00 ENCOUNTER FOR ANNUAL HEALTH EXAMINATION: ICD-10-CM

## 2019-10-09 DIAGNOSIS — L30.8 OTHER ECZEMA: ICD-10-CM

## 2019-10-09 DIAGNOSIS — M25.511 CHRONIC RIGHT SHOULDER PAIN: ICD-10-CM

## 2019-10-09 DIAGNOSIS — Z12.5 ENCOUNTER FOR SCREENING FOR MALIGNANT NEOPLASM OF PROSTATE: ICD-10-CM

## 2019-10-09 DIAGNOSIS — Z28.21 IMMUNIZATION NOT CARRIED OUT BECAUSE OF PATIENT REFUSAL: ICD-10-CM

## 2019-10-09 DIAGNOSIS — G89.29 CHRONIC RIGHT SHOULDER PAIN: ICD-10-CM

## 2019-10-09 PROCEDURE — 99214 OFFICE O/P EST MOD 30 MIN: CPT | Performed by: FAMILY MEDICINE

## 2019-10-09 PROCEDURE — G0439 PPPS, SUBSEQ VISIT: HCPCS | Performed by: FAMILY MEDICINE

## 2019-10-09 RX ORDER — DICLOFENAC SODIUM 75 MG/1
TABLET, DELAYED RELEASE ORAL
Qty: 90 TABLET | Refills: 1 | Status: SHIPPED | OUTPATIENT
Start: 2019-10-09 | End: 2020-07-20

## 2019-10-09 NOTE — PROGRESS NOTES
HPI:   Kim Briseno is a 75year old male who presents for a Medicare Subsequent Annual Wellness visit (Pt already had Initial Annual Wellness).     Here for wellness    Right shoulder pain  -stable  -meloxicam helping    Left knee pain  -started several Family/surrogate (if present), and forms available to patient in AVS       He does NOT have a Power of  for [employer] on file in [place].    Advance care planning including the explanation and discussion of advance directives standard forms performed Diclofenac Sodium 1 % Transdermal Gel Apply 2 g topically 2 (two) times daily as needed. MEDICAL INFORMATION:   He  has a past medical history of Back injury. He  has a past surgical history that includes colonoscopy (2014).     His family history Pulses: 2+ and symmetric   Skin: Skin color, texture, turgor normal, no rashes or lesions   Lymph nodes: Cervical, supraclavicular, and axillary nodes normal   Neurologic: Normal            Vaccination History     Immunization History   Administered Date NEEDED FOR PAIN. Start this after meloxicam runs out. -     triamcinolone acetonide 0.1 % External Cream; Apply to affected area bid x 1-2 weeks  -     Diclofenac Sodium 1 % Transdermal Gel; Apply 2 g topically 2 (two) times daily as needed.          Diet Ophthalmology Visit Annually: Diabetics, FHx Glaucoma, AA>50, > 65 No flowsheet data found.     Prostate Cancer Screening      PSA  Annually PSA due on 10/29/2018  Update Health Maintenance if applicable     Immunizations (Update Immunization Act

## 2019-10-10 ENCOUNTER — TELEPHONE (OUTPATIENT)
Dept: FAMILY MEDICINE CLINIC | Facility: CLINIC | Age: 74
End: 2019-10-10

## 2019-10-15 ENCOUNTER — TELEPHONE (OUTPATIENT)
Dept: FAMILY MEDICINE CLINIC | Facility: CLINIC | Age: 74
End: 2019-10-15

## 2019-10-15 NOTE — TELEPHONE ENCOUNTER
Per Cover My Meds 10/15/19: Your information has been sent to [address]: [id number]    PA Case ID: [id number]

## 2019-10-15 NOTE — TELEPHONE ENCOUNTER
sugar is elevated, very close to diabetes    Cholesterol is still high - bad cholesterol (LDL) is much higher than we like    increase exercise, reduce fats and sugars, more fruits and veggies, more fish in diet    We will recheck labs when he comes back

## 2019-10-15 NOTE — TELEPHONE ENCOUNTER
Patients son Anna Marie Shown was wondering if lab results of his father have been viewed yet. [place] are leaving out of the country Friday and would like the results.

## 2020-07-20 ENCOUNTER — OFFICE VISIT (OUTPATIENT)
Dept: FAMILY MEDICINE CLINIC | Facility: CLINIC | Age: 75
End: 2020-07-20
Payer: MEDICARE

## 2020-07-20 VITALS
HEIGHT: 66.34 IN | TEMPERATURE: 98 F | HEART RATE: 75 BPM | WEIGHT: 145.5 LBS | DIASTOLIC BLOOD PRESSURE: 60 MMHG | SYSTOLIC BLOOD PRESSURE: 108 MMHG | BODY MASS INDEX: 23.11 KG/M2 | OXYGEN SATURATION: 98 %

## 2020-07-20 DIAGNOSIS — H91.8X3 OTHER SPECIFIED HEARING LOSS OF BOTH EARS: Primary | ICD-10-CM

## 2020-07-20 DIAGNOSIS — G89.29 CHRONIC PAIN OF LEFT KNEE: ICD-10-CM

## 2020-07-20 DIAGNOSIS — M25.562 CHRONIC PAIN OF LEFT KNEE: ICD-10-CM

## 2020-07-20 PROCEDURE — 99214 OFFICE O/P EST MOD 30 MIN: CPT | Performed by: FAMILY MEDICINE

## 2020-07-20 RX ORDER — DICLOFENAC SODIUM 75 MG/1
TABLET, DELAYED RELEASE ORAL
Qty: 90 TABLET | Refills: 1 | Status: SHIPPED | OUTPATIENT
Start: 2020-07-20 | End: 2020-11-09

## 2020-07-20 NOTE — PROGRESS NOTES
Avenelcelia Chinchilla is a 67year old male here for Patient presents with:  Knee Pain: Left Knee pain x 1 years, the pain is getting worse  Hearing Loss: Patient would like to get referred to get hearing aids      HPI:       1.  Other specified hearing loss of sharri (Oral)   Ht 66.34\"   Wt 145 lb 8 oz (66 kg)   SpO2 98%   BMI 23.24 kg/m²     Gen: NAD, alert and oriented x 3  HEENT: NCAT, pupils equal and round  Pulm: CTAB, no wheezing  CV: RRR, no murmurs  Ext: full ROM in left knee with mild effusion and joint line

## 2020-07-20 NOTE — PATIENT INSTRUCTIONS
Continue diclofenac pills as needed and gel as needed    Consider knee brace during day if it helps    Ice as needed    Continue stretching    Call to schedule PT - let me know if getting worse after PT    Call to schedule ENT for hearing    Start mya

## 2020-08-06 ENCOUNTER — OFFICE VISIT (OUTPATIENT)
Dept: PHYSICAL THERAPY | Age: 75
End: 2020-08-06
Attending: FAMILY MEDICINE
Payer: MEDICARE

## 2020-08-06 DIAGNOSIS — G89.29 CHRONIC PAIN OF LEFT KNEE: ICD-10-CM

## 2020-08-06 DIAGNOSIS — M25.562 CHRONIC PAIN OF LEFT KNEE: ICD-10-CM

## 2020-08-06 PROCEDURE — 97110 THERAPEUTIC EXERCISES: CPT

## 2020-08-06 PROCEDURE — 97161 PT EVAL LOW COMPLEX 20 MIN: CPT

## 2020-08-06 NOTE — PROGRESS NOTES
LOWER EXTREMITY EVALUATION:   Referring Physician: Dr. Melyssa Walker  Diagnosis: Chronic pain of left knee      Date of Service: 8/6/2020     PATIENT SUMMARY   Tra Espinal is a 67year old male who presents to therapy today with complaints of left knee pain left knee pain. His goals include to reduce his left knee pain as much as possible and sit down on the floor without any assistance getting back up.  The patient's past medical history was reviewed and significant findings include numbness in bilateral feet include but are not limited to negotiating stairs, sleeping (waking up 1-2 times a night), ambulating long distances, squatting, sitting on the floor, and praying on the floor. The patient and PT discussed evaluation findings, pathology, POC and HEP.  [employer] expectations, and prognosis. Pt was also provided recommendations for activity modifications, possible soreness after evaluation, modalities as needed [ice/heat], postural corrections, ergonomics, pain science education  and shoe wear.   Patient was instruc total of 8 visits over a 90 day period.  Treatment will include: Gait training, Manual Therapy, Neuromuscular Re-education, Self-Care Home Management, Therapeutic Activities, Therapeutic Exercise and Home Exercise Program instruction    Education or treatme

## 2020-08-10 ENCOUNTER — OFFICE VISIT (OUTPATIENT)
Dept: PHYSICAL THERAPY | Age: 75
End: 2020-08-10
Attending: FAMILY MEDICINE
Payer: MEDICARE

## 2020-08-10 PROCEDURE — 97110 THERAPEUTIC EXERCISES: CPT

## 2020-08-10 PROCEDURE — 97140 MANUAL THERAPY 1/> REGIONS: CPT

## 2020-08-10 NOTE — PROGRESS NOTES
Dx: Chronic pain of left knee          Insurance (Authorized # of Visits):  6           Authorizing Physician: Dr. Ej Christensen MD visit: none scheduled  Fall Risk: standard         Precautions: n/a             Subjective:  The patient comes into therapy wit ability to ascend/descend 1 flight of stairs reciprocally without use of handrail   · Pt will demonstrate improved SLS to > or = to 20 seconds BRIGHT to promote safety and decrease risk of falls on uneven surfaces such as grass and gravel   · Pt will improve

## 2020-08-13 ENCOUNTER — OFFICE VISIT (OUTPATIENT)
Dept: PHYSICAL THERAPY | Age: 75
End: 2020-08-13
Attending: FAMILY MEDICINE
Payer: MEDICARE

## 2020-08-13 PROCEDURE — 97140 MANUAL THERAPY 1/> REGIONS: CPT

## 2020-08-13 PROCEDURE — 97110 THERAPEUTIC EXERCISES: CPT

## 2020-08-14 NOTE — PROGRESS NOTES
Dx: Chronic pain of left knee          Insurance (Authorized # of Visits):  6           Authorizing Physician: Dr. Ej Christensen MD visit: none scheduled  Fall Risk: standard         Precautions: n/a             Subjective:  The patient comes into therapy wit of stairs reciprocally without use of handrail   · Pt will demonstrate improved SLS to > or = to 20 seconds BRIGHT to promote safety and decrease risk of falls on uneven surfaces such as grass and gravel   · Pt will improve left knee extension ROM to 0 deg to L knee - 2 mins      Education on HEP and safety - 6 mins Education on HEP - 4 mins       Manual Left adductor stretch - 60 sec 4 sets       IASTM to L adductors - 8 mins               Charges:  There Ex x 2, MT x 1       Total Timed Treatment: 45 min  Tota

## 2020-08-17 ENCOUNTER — OFFICE VISIT (OUTPATIENT)
Dept: PHYSICAL THERAPY | Age: 75
End: 2020-08-17
Attending: FAMILY MEDICINE
Payer: MEDICARE

## 2020-08-17 PROCEDURE — 97110 THERAPEUTIC EXERCISES: CPT

## 2020-08-17 PROCEDURE — 97140 MANUAL THERAPY 1/> REGIONS: CPT

## 2020-08-17 NOTE — PROGRESS NOTES
Dx: Chronic pain of left knee          Insurance (Authorized # of Visits):  6           Authorizing Physician: Dr. Lopez Jerry  Next MD visit: none scheduled  Fall Risk: standard         Precautions: n/a             Subjective:  The patient comes into therapy wit allow proper heel strike during gait and terminal knee extension in stance   · Pt will improve quad strength to 5/5 to ascend 1 flight of stairs reciprocally without UE assist   · Pt will increase hip and knee strength to grossly 4+/5 to be able to get up 6 mins Left inferior/superior and medial/lateral patella mobilizations - 5 mins Left inferior/superior and medial/lateral patella mobilizations - 5 mins     Martino Tape to L knee - 3 mins Martino Tape to L knee - 2 mins Martino Tape to L knee - 3 mi

## 2020-08-18 ENCOUNTER — MED REC SCAN ONLY (OUTPATIENT)
Dept: FAMILY MEDICINE CLINIC | Facility: CLINIC | Age: 75
End: 2020-08-18

## 2020-08-20 ENCOUNTER — OFFICE VISIT (OUTPATIENT)
Dept: PHYSICAL THERAPY | Age: 75
End: 2020-08-20
Attending: FAMILY MEDICINE
Payer: MEDICARE

## 2020-08-20 PROCEDURE — 97140 MANUAL THERAPY 1/> REGIONS: CPT

## 2020-08-20 PROCEDURE — 97110 THERAPEUTIC EXERCISES: CPT

## 2020-08-20 NOTE — PROGRESS NOTES
Dx: Chronic pain of left knee          Insurance (Authorized # of Visits):  6           Authorizing Physician: Dr. Heath Downing  Next MD visit: none scheduled  Fall Risk: standard         Precautions: n/a             Subjective:  The patient comes into therapy wit grossly 4+/5 to be able to get up and down from the floor safely   · Pt will sit on the floor and engage in praying for 60 minutes with < or = VAS 1/10 left knee pain  · Pt will ambulate for 30 minutes with < or = VAS 1/10 left knee pain  · Pt will sleep t and downs - x 20 reps, 1 set each    Left inferior/superior and medial/lateral patella mobilizations - 6 mins Left inferior/superior and medial/lateral patella mobilizations - 5 mins Left inferior/superior and medial/lateral patella mobilizations - 5 mins

## 2020-08-24 ENCOUNTER — OFFICE VISIT (OUTPATIENT)
Dept: PHYSICAL THERAPY | Age: 75
End: 2020-08-24
Attending: FAMILY MEDICINE
Payer: MEDICARE

## 2020-08-24 PROCEDURE — 97140 MANUAL THERAPY 1/> REGIONS: CPT

## 2020-08-24 PROCEDURE — 97110 THERAPEUTIC EXERCISES: CPT

## 2020-08-24 NOTE — PROGRESS NOTES
Dx: Chronic pain of left knee          Insurance (Authorized # of Visits):  6           Authorizing Physician: Dr. Ghislaine Diaz  Next MD visit: none scheduled  Fall Risk: standard         Precautions: n/a             Subjective:  The patient comes into therapy wit allow proper heel strike during gait and terminal knee extension in stance   · Pt will improve quad strength to 5/5 to ascend 1 flight of stairs reciprocally without UE assist   · Pt will increase hip and knee strength to grossly 4+/5 to be able to get up out, x 15 reps, 1 set -- --   Supine hamstring stretch, x 30 sec, 3 sets -- -- -- --   LBW/MW w/ RB - 20 feet - 4 sets LBW/MW w/ RB - 20 feet - 4 sets LBW/MW w/ RB - 20 feet - 4 sets LBW/MW w/ RB - 20 feet - 4 sets LBW/MW w/ GB - 20 feet - 4 sets   6\" and

## 2020-08-27 ENCOUNTER — OFFICE VISIT (OUTPATIENT)
Dept: PHYSICAL THERAPY | Age: 75
End: 2020-08-27
Attending: FAMILY MEDICINE
Payer: MEDICARE

## 2020-08-27 PROCEDURE — 97110 THERAPEUTIC EXERCISES: CPT

## 2020-08-27 PROCEDURE — 97140 MANUAL THERAPY 1/> REGIONS: CPT

## 2020-08-27 PROCEDURE — 97112 NEUROMUSCULAR REEDUCATION: CPT

## 2020-08-27 NOTE — PROGRESS NOTES
Dx: Chronic pain of left knee          Insurance (Authorized # of Visits):  6           Authorizing Physician: Dr. Micah Vyas  Next MD visit: none scheduled  Fall Risk: standard         Precautions: n/a             Subjective:  The patient comes into therapy wit 0 deg to allow proper heel strike during gait and terminal knee extension in stance   · Pt will improve quad strength to 5/5 to ascend 1 flight of stairs reciprocally without UE assist   · Pt will increase hip and knee strength to grossly 4+/5 to be able t reps, 2 sets --    Wall squats w/ thera ball hold out, x 15 reps, 1 set -- -- Wall squats w/ thera ball hold out, x 15 reps, 1 set   -- -- -- -- Elevated SL squats from mat, x 10 reps, 2 sets   LBW/MW w/ RB - 20 feet - 4 sets LBW/MW w/ RB - 20 feet - 4 set

## 2020-08-31 ENCOUNTER — OFFICE VISIT (OUTPATIENT)
Dept: PHYSICAL THERAPY | Age: 75
End: 2020-08-31
Attending: FAMILY MEDICINE
Payer: MEDICARE

## 2020-08-31 ENCOUNTER — APPOINTMENT (OUTPATIENT)
Dept: PHYSICAL THERAPY | Age: 75
End: 2020-08-31
Attending: FAMILY MEDICINE
Payer: MEDICARE

## 2020-08-31 PROCEDURE — 97110 THERAPEUTIC EXERCISES: CPT

## 2020-08-31 NOTE — PROGRESS NOTES
Discharge Summary    Pt has attended 8 visits in Physical Therapy. Subjective: The patient comes into therapy with no left knee pain. Allen Richardson has been very diligent with adhering to his HEP.  At this time he reports feeling 90% improvement with his left almost no discomfort negotiating stairs. Drew Post was able to meet 8/11 functional goals at this time. The patient subjectively states feeling 90% improvement since beginning his POC.  Due to his objective and subjective improvements, therapy is recommending A TX#: 5/8 Date: 8/24/20  Tx#: 6/8 Date: 8/27/20  Tx#: 7/8 Date: 8/31/20  Tx#: 8/8   Sports Art Bike - 6 mins Sports Art Bike - 6 mins Sports Art Bike - 6 mins Sports Art Bike - 6 mins --       Objective test and measures, HEP review, and education - 45 taps, x 15 reps, 1 set 4\" heel taps, x 15 reps, 1 set 4\" heel taps, x 10 reps, 2 sets -- --   -- -- Forward lunges w/ BUE support inside parallel bars, x 10 reps, 2 sets Forward lunges w/ BUE support inside parallel bars, x 10 reps, 2 sets --   Manual Singh Salazar

## 2020-11-09 ENCOUNTER — TELEMEDICINE (OUTPATIENT)
Dept: FAMILY MEDICINE CLINIC | Facility: CLINIC | Age: 75
End: 2020-11-09
Payer: MEDICARE

## 2020-11-09 DIAGNOSIS — M79.604 RIGHT LEG PAIN: Primary | ICD-10-CM

## 2020-11-09 DIAGNOSIS — S76.311A STRAIN OF RIGHT HAMSTRING MUSCLE, INITIAL ENCOUNTER: ICD-10-CM

## 2020-11-09 PROCEDURE — 99214 OFFICE O/P EST MOD 30 MIN: CPT | Performed by: FAMILY MEDICINE

## 2020-11-09 RX ORDER — DICLOFENAC SODIUM 75 MG/1
TABLET, DELAYED RELEASE ORAL
Qty: 90 TABLET | Refills: 1 | Status: SHIPPED | OUTPATIENT
Start: 2020-11-09

## 2020-11-09 NOTE — PROGRESS NOTES
Virtual/Telephone Check-In    Dennys Anderson is a 67year old male here today for a telemedicine audio and video visit. HPI:       1. Right leg pain  2.  Strain of right hamstring muscle, initial encounter  -started several days ago without clear injur COLONOSCOPY  2014      Family History   Problem Relation Age of Onset   • Diabetes Mother       Social History: Social History    Tobacco Use      Smoking status: Never Smoker      Smokeless tobacco: Never Used    Alcohol use: No      Alcohol/week: 0.0 sta advised of the potential privacy & security concerns related to the telehealth platform. The patient was made aware of where to find [facility] notice of privacy practices, telehealth consent form and other related consent forms and documents.   which are loca

## 2020-11-09 NOTE — PATIENT INSTRUCTIONS
Call to schedule PT    Suspect hamstring sprain    Continue with diclofenac pills and cream as needed  Continue muscle spray  Start heat or ice as needed  Continue massage    Followup in 1 month as planned

## 2020-11-10 NOTE — PROGRESS NOTES
LOWER EXTREMITY EVALUATION:   Referring Physician: Dr. Gurvinder Willams  Diagnosis: Right leg pain (M79.604)  Strain of right hamstring muscle, initial encounter (N19.517T)     Date of Service: 11/10/2020     PATIENT SUMMARY   Robin Tello is a 67year old male findings include numbness in bilateral feet, GERD, Diarrhea, chronic right shoulder pain, dermatitis, and dry eye.       ASSESSMENT  Reid Rincon presents to physical therapy evaluation with primary c/o right knee pain that he describes as sharp with flexion and e and anterior hip mobilizations.     Flexibility:  Hip Flexor: R; mod L; mild  Hamstrings: R; mod L; mild  Piriformis: R; mild L; mild  Quads: R; mild L; WNL  Gastroc-soleus: R; mod L; mild    Strength/MMT:   Hip Knee Foot/Ankle   Flexion: R; 4/5; L; 4+/5  E · Pt will demonstrate improved SLS to > 30 seconds BRIGHT to promote safety and decrease risk of falls on uneven surfaces such as grass and gravel    · Pt will increase hip and knee strength to grossly 4+/5 to be able to get up and down from the floor safel

## 2020-11-11 ENCOUNTER — OFFICE VISIT (OUTPATIENT)
Dept: PHYSICAL THERAPY | Age: 75
End: 2020-11-11
Attending: FAMILY MEDICINE
Payer: MEDICARE

## 2020-11-11 DIAGNOSIS — S76.311A STRAIN OF RIGHT HAMSTRING MUSCLE, INITIAL ENCOUNTER: ICD-10-CM

## 2020-11-11 DIAGNOSIS — M79.604 RIGHT LEG PAIN: ICD-10-CM

## 2020-11-11 PROCEDURE — 97110 THERAPEUTIC EXERCISES: CPT

## 2020-11-11 PROCEDURE — 97161 PT EVAL LOW COMPLEX 20 MIN: CPT

## 2020-11-16 ENCOUNTER — OFFICE VISIT (OUTPATIENT)
Dept: PHYSICAL THERAPY | Age: 75
End: 2020-11-16
Attending: FAMILY MEDICINE
Payer: MEDICARE

## 2020-11-16 PROCEDURE — 97140 MANUAL THERAPY 1/> REGIONS: CPT

## 2020-11-16 PROCEDURE — 97110 THERAPEUTIC EXERCISES: CPT

## 2020-11-16 NOTE — PROGRESS NOTES
Dx: Right leg pain (M79.604)  Strain of right hamstring muscle, initial encounter (S76.311A)           Insurance (Authorized # of Visits):  6           Authorizing Physician: Dr. Daniel Byrne  Next MD visit: none scheduled  Fall Risk: standard         Precautions risk of falls on uneven surfaces such as grass and gravel    · Pt will increase hip and knee strength to grossly 4+/5 to be able to get up and down from the floor safely  · Pt will sit on the toilet seat with < or = to VAS 1/10 right knee pain/discomfort f

## 2020-11-18 ENCOUNTER — OFFICE VISIT (OUTPATIENT)
Dept: PHYSICAL THERAPY | Age: 75
End: 2020-11-18
Attending: FAMILY MEDICINE
Payer: MEDICARE

## 2020-11-18 PROCEDURE — 97110 THERAPEUTIC EXERCISES: CPT

## 2020-11-18 PROCEDURE — 97140 MANUAL THERAPY 1/> REGIONS: CPT

## 2020-11-18 NOTE — PROGRESS NOTES
Dx: Right leg pain (M79.604)  Strain of right hamstring muscle, initial encounter (S76.311A)           Insurance (Authorized # of Visits):  6           Authorizing Physician: Dr. Gurvinder Willams  Next MD visit: none scheduled  Fall Risk: standard         Precautions achieved in PT       Plan: POC will emphasize hip/core/quad/hamstring strengthening, flexibility, STM/IASTM, balance, and anterior hip mobilizations  Date: 11/16/2020  TX#: 2/8 Date: 11/18/20                TX#: 3/8 Date:                 TX#: 4/ Date:

## 2020-11-23 ENCOUNTER — OFFICE VISIT (OUTPATIENT)
Dept: PHYSICAL THERAPY | Age: 75
End: 2020-11-23
Attending: FAMILY MEDICINE
Payer: MEDICARE

## 2020-11-23 PROCEDURE — 97140 MANUAL THERAPY 1/> REGIONS: CPT

## 2020-11-23 PROCEDURE — 97110 THERAPEUTIC EXERCISES: CPT

## 2020-11-23 NOTE — PROGRESS NOTES
Dx: Right leg pain (M79.604)  Strain of right hamstring muscle, initial encounter (S76.311A)           Insurance (Authorized # of Visits):  6           Authorizing Physician: Dr. Gurvinder Willams  Next MD visit: none scheduled  Fall Risk: standard         Precautions for three consecutive days  · Pt will report no right knee pain with bed mobility for one week  · Pt will negotiate his stairs at home with < or = to VAS 1/10 right knee pain for one week  · Pt will be independent and compliant with comprehensive HEP to ma sets each side              Charges: MT x 1, TherEx x 2       Total Timed Treatment: 45 min  Total Treatment Time: 45 min

## 2020-11-25 ENCOUNTER — OFFICE VISIT (OUTPATIENT)
Dept: PHYSICAL THERAPY | Age: 75
End: 2020-11-25
Attending: FAMILY MEDICINE
Payer: MEDICARE

## 2020-11-25 PROCEDURE — 97530 THERAPEUTIC ACTIVITIES: CPT

## 2020-11-25 PROCEDURE — 97140 MANUAL THERAPY 1/> REGIONS: CPT

## 2020-11-25 NOTE — PROGRESS NOTES
Dx: Right leg pain (M79.604)  Strain of right hamstring muscle, initial encounter (S76.311A)           Insurance (Authorized # of Visits):  6           Authorizing Physician: Dr. Jeremie Brown  Next MD visit: none scheduled  Fall Risk: standard         Precautions progress achieved in PT -progress    Plan: POC will emphasize hip/core/quad/hamstring strengthening, flexibility, STM/IASTM, balance, and anterior hip mobilizations  Date: 11/16/2020  TX#: 2/8 Date: 11/18/20                TX#: 3/8 Date: 11/23/20 weights, x 15 reps, 1 set     4\" heel taps, x 10 reps, 2 sets each side 4\" heel taps, x 10 reps, 2 sets each side 4\" heel taps, x 10 reps, 2 sets each side       SLS x3 trials ea      Charges: MT x 1, TherEx x 2       Total Timed Treatment: 45 min  Tota

## 2020-11-30 ENCOUNTER — OFFICE VISIT (OUTPATIENT)
Dept: PHYSICAL THERAPY | Age: 75
End: 2020-11-30
Attending: FAMILY MEDICINE
Payer: MEDICARE

## 2020-11-30 PROCEDURE — 97140 MANUAL THERAPY 1/> REGIONS: CPT

## 2020-11-30 PROCEDURE — 97110 THERAPEUTIC EXERCISES: CPT

## 2020-11-30 NOTE — PROGRESS NOTES
Dx: Right leg pain (M79.604)  Strain of right hamstring muscle, initial encounter (S76.311A)           Insurance (Authorized # of Visits):  6           Authorizing Physician: Dr. Kia Carlton  Next MD visit: none scheduled  Fall Risk: standard         Precautions -progress  · Pt will be independent and compliant with comprehensive HEP to maintain progress achieved in PT -progress      Plan: The patient will be formally reassessed at his next session, 12/2/20.   Date: 11/18/20                TX#: 3/8 Date: 11/23/20 3 lb ankle weights, x 10 reps, 2 sets Hip Ext kicks w/ 2 lb ankle weights, x 15 reps, 1 set --   4\" heel taps, x 10 reps, 2 sets each side 4\" heel taps, x 10 reps, 2 sets each side 4\" heel taps, x 10 reps, 2 sets each side 4\" heel taps, x 10 reps, 2 se

## 2020-12-01 NOTE — PROGRESS NOTES
Discharge Summary    Pt has attended 7 visits in Physical Therapy. Subjective: The patient comes into therapy with no pain. Jessica's son is here again to help translate.  He notes his pain and functional ability with sit to stands, bed mobility, stairs only significant limitation at this time. Therapy reviewed Jessica's HEP with him to focus on while he is away in [facility]. Lorraine Watkins is leaving the country for the next 3-4 months and will not be able to continue his POC at this time.  He will be formally discha Martino Tape to bilateral knees - 4 mins -- --   LBW/MW/BMW w/ BB - x 50 feet, 2 sets each - LBW/MW/BMW w/ BB - x 50 feet, 2 sets each --   -- Bilateral Patella Mobs - 6 mins Bilateral Patella Mobs - 6 mins --   Hamstring Stretch, x 30 sec, 3 sets ROMELIA Zee Son

## 2020-12-02 ENCOUNTER — OFFICE VISIT (OUTPATIENT)
Dept: PHYSICAL THERAPY | Age: 75
End: 2020-12-02
Attending: FAMILY MEDICINE
Payer: MEDICARE

## 2020-12-02 ENCOUNTER — OFFICE VISIT (OUTPATIENT)
Dept: FAMILY MEDICINE CLINIC | Facility: CLINIC | Age: 75
End: 2020-12-02
Payer: MEDICARE

## 2020-12-02 VITALS
WEIGHT: 149 LBS | HEIGHT: 66.22 IN | TEMPERATURE: 98 F | SYSTOLIC BLOOD PRESSURE: 112 MMHG | BODY MASS INDEX: 23.95 KG/M2 | DIASTOLIC BLOOD PRESSURE: 58 MMHG | HEART RATE: 70 BPM | OXYGEN SATURATION: 98 %

## 2020-12-02 DIAGNOSIS — E78.5 HYPERLIPIDEMIA, UNSPECIFIED HYPERLIPIDEMIA TYPE: ICD-10-CM

## 2020-12-02 DIAGNOSIS — Z13.29 SCREENING FOR THYROID DISORDER: ICD-10-CM

## 2020-12-02 DIAGNOSIS — R73.9 HYPERGLYCEMIA: ICD-10-CM

## 2020-12-02 DIAGNOSIS — Z00.00 ROUTINE GENERAL MEDICAL EXAMINATION AT A HEALTH CARE FACILITY: Primary | ICD-10-CM

## 2020-12-02 PROCEDURE — 97110 THERAPEUTIC EXERCISES: CPT

## 2020-12-02 PROCEDURE — 99214 OFFICE O/P EST MOD 30 MIN: CPT | Performed by: FAMILY MEDICINE

## 2020-12-02 PROCEDURE — G0439 PPPS, SUBSEQ VISIT: HCPCS | Performed by: FAMILY MEDICINE

## 2020-12-02 PROCEDURE — 97140 MANUAL THERAPY 1/> REGIONS: CPT

## 2020-12-02 NOTE — PROGRESS NOTES
HPI:   Kathy Nobles is a 67year old male who presents for a Medicare Subsequent Annual Wellness visit (Pt already had Initial Annual Wellness).     Here for wellness    Right shoulder pain  -stable  -exercises helping  -uses diclofenac pills sparingly  - screening   Caridad Marc was screened for Alcohol abuse and had a score of 0 so is at low risk.      Patient Care Team: Patient Care Team:  Do Morgan MD as PCP - General (Family Medicine)  Florestine Runner, PT as Physical Therapist (Physical Mila Luther or use drugs.      REVIEW OF SYSTEMS:      Negative except above    EXAM:   /58 (BP Location: Left arm, Patient Position: Sitting, Cuff Size: adult)   Pulse 70   Temp 97.8 °F (36.6 °C) (Oral)   Ht 5' 6.22\" (1.682 m)   Wt 149 lb (67.6 kg)   SpO2 98% older PFS 0.5 ml (49263) 10/20/2016   • Kenalog Per 10mg Inj 10/03/2019   Pended Date(s) Pended   • FLUZONE 6 months and older PFS 0.5 ml (75589) 10/20/2016   Deferred Date(s) Deferred   • Influenza Vaccine Refused 10/09/2019        ASSESSMENT AND OTHER RE No flowsheet data found.     Fasting Blood Sugar (FSB)Annually Glucose (mg/dL)   Date Value   10/09/2019 125 (H)     GLUCOSE (mg/dL)   Date Value   04/02/2014 97       Cardiovascular Disease Screening     LDL Annually LDL Cholesterol (mg/dL)   Date Value be covered with your prescription benefits, but Medicare does not cover unless Medically needed    Zoster   Not covered by Medicare Part B No vaccine history found This may be covered with your pharmacy  prescription benefits             I spent a total of

## 2020-12-03 ENCOUNTER — LAB ENCOUNTER (OUTPATIENT)
Dept: LAB | Age: 75
End: 2020-12-03
Attending: FAMILY MEDICINE
Payer: MEDICARE

## 2020-12-03 DIAGNOSIS — Z13.29 SCREENING FOR THYROID DISORDER: ICD-10-CM

## 2020-12-03 DIAGNOSIS — E78.5 HYPERLIPIDEMIA, UNSPECIFIED HYPERLIPIDEMIA TYPE: ICD-10-CM

## 2020-12-03 DIAGNOSIS — Z00.00 ROUTINE GENERAL MEDICAL EXAMINATION AT A HEALTH CARE FACILITY: ICD-10-CM

## 2020-12-03 DIAGNOSIS — R73.9 HYPERGLYCEMIA: ICD-10-CM

## 2020-12-03 PROCEDURE — 85025 COMPLETE CBC W/AUTO DIFF WBC: CPT

## 2020-12-03 PROCEDURE — 36415 COLL VENOUS BLD VENIPUNCTURE: CPT

## 2020-12-03 PROCEDURE — 80053 COMPREHEN METABOLIC PANEL: CPT

## 2020-12-03 PROCEDURE — 80061 LIPID PANEL: CPT

## 2020-12-03 PROCEDURE — 84443 ASSAY THYROID STIM HORMONE: CPT

## 2020-12-03 PROCEDURE — 83036 HEMOGLOBIN GLYCOSYLATED A1C: CPT

## 2020-12-09 ENCOUNTER — PATIENT MESSAGE (OUTPATIENT)
Dept: FAMILY MEDICINE CLINIC | Facility: CLINIC | Age: 75
End: 2020-12-09

## 2020-12-09 NOTE — TELEPHONE ENCOUNTER
From: [address]  To: Misty Lorenzana MD  Sent: 12/9/2020 7:28 AM CST  Subject: Test Results Question    I have a question about LIPID PANEL resulted on 12/3/20 at 12:55 PM.  Hi Dr. Marifer Donnelly,    I see abnormal results in LIPID test. What do you suggest fo

## 2021-03-13 DIAGNOSIS — Z23 NEED FOR VACCINATION: ICD-10-CM

## 2021-07-01 ENCOUNTER — PATIENT MESSAGE (OUTPATIENT)
Dept: FAMILY MEDICINE CLINIC | Facility: CLINIC | Age: 76
End: 2021-07-01

## 2021-07-02 NOTE — TELEPHONE ENCOUNTER
From: Madie Macario  To: Dayanara Munguia MD  Sent: 7/1/2021 5:02 PM CDT  Subject: Other    Hi Dr. Hart Case  This is Jessica's son Darling Carvajal. My father is having an episode of heartburn like he had in 2013 and in 2019.  He took Prilosec OTC as a 14day course but sti

## 2021-08-02 ENCOUNTER — TELEMEDICINE (OUTPATIENT)
Dept: FAMILY MEDICINE CLINIC | Facility: CLINIC | Age: 76
End: 2021-08-02

## 2021-08-02 DIAGNOSIS — M25.512 CHRONIC LEFT SHOULDER PAIN: ICD-10-CM

## 2021-08-02 DIAGNOSIS — G89.29 CHRONIC LEFT SHOULDER PAIN: ICD-10-CM

## 2021-08-02 DIAGNOSIS — F43.29 STRESS AND ADJUSTMENT REACTION: ICD-10-CM

## 2021-08-02 DIAGNOSIS — K21.9 GASTROESOPHAGEAL REFLUX DISEASE WITHOUT ESOPHAGITIS: Primary | ICD-10-CM

## 2021-08-02 PROCEDURE — 99214 OFFICE O/P EST MOD 30 MIN: CPT | Performed by: FAMILY MEDICINE

## 2021-08-02 RX ORDER — OMEPRAZOLE 40 MG/1
40 CAPSULE, DELAYED RELEASE ORAL
Qty: 60 CAPSULE | Refills: 1 | Status: SHIPPED | OUTPATIENT
Start: 2021-08-02 | End: 2021-08-02

## 2021-08-02 RX ORDER — ROSUVASTATIN CALCIUM 10 MG/1
10 TABLET, COATED ORAL NIGHTLY
COMMUNITY
End: 2021-08-12

## 2021-08-02 RX ORDER — HYDROXYZINE HYDROCHLORIDE 25 MG/1
TABLET, FILM COATED ORAL NIGHTLY PRN
Qty: 30 TABLET | Refills: 1 | Status: SHIPPED | OUTPATIENT
Start: 2021-08-02 | End: 2021-10-01

## 2021-08-02 RX ORDER — PANTOPRAZOLE SODIUM 40 MG/1
TABLET, DELAYED RELEASE ORAL
COMMUNITY
Start: 2021-07-02 | End: 2021-08-02 | Stop reason: ALTCHOICE

## 2021-08-02 RX ORDER — OMEPRAZOLE 40 MG/1
CAPSULE, DELAYED RELEASE ORAL
Qty: 180 CAPSULE | Refills: 0 | Status: SHIPPED | OUTPATIENT
Start: 2021-08-02 | End: 2021-11-03

## 2021-08-02 RX ORDER — AMLODIPINE BESYLATE 10 MG/1
10 TABLET ORAL DAILY
COMMUNITY
End: 2021-08-12

## 2021-08-02 RX ORDER — OMEPRAZOLE 40 MG/1
40 CAPSULE, DELAYED RELEASE ORAL DAILY
Qty: 90 CAPSULE | Refills: 3 | Status: SHIPPED | OUTPATIENT
Start: 2021-08-02 | End: 2021-08-02

## 2021-08-02 NOTE — PATIENT INSTRUCTIONS
Stop sitagliptin    If symptoms not better in next 2 days, can start omeprazole 2x/day 30 min before meals    Hydroxyzine as needed for sleep/anxiety    Followup as planned

## 2021-08-02 NOTE — TELEPHONE ENCOUNTER
Omeprazole 40 MG Oral Capsule Delayed Release 60 capsule 1 8/2/2021 7/28/2022   Sig:   Take 1 capsule (40 mg total) by mouth 2 (two) times daily before meals.      Route:   Oral

## 2021-08-02 NOTE — PROGRESS NOTES
Virtual/Telephone Check-In    Dunia Pettit is a 67year old male here today for a telemedicine audio and video visit. HPI:       1.  Gastroesophageal reflux disease without esophagitis  -started 2-3 months ago  -had in past and dexilant helped  -was coughing during duration of visit  Psych: normal affect      HISTORY:       Past Medical History:   Diagnosis Date   • Back injury     65s      Past Surgical History:   Procedure Laterality Date   • COLONOSCOPY  2014      Family History   Problem Relatio today, 08/02/21, which was completed using two-way, real-time interactive audio and video communication.  This has been done in good inez to provide continuity of care in the best interest of the provider-patient relationship, due to the COVID -19 public h

## 2021-08-12 ENCOUNTER — OFFICE VISIT (OUTPATIENT)
Dept: FAMILY MEDICINE CLINIC | Facility: CLINIC | Age: 76
End: 2021-08-12
Payer: MEDICARE

## 2021-08-12 VITALS
TEMPERATURE: 97 F | SYSTOLIC BLOOD PRESSURE: 118 MMHG | HEART RATE: 92 BPM | HEIGHT: 66.22 IN | OXYGEN SATURATION: 98 % | DIASTOLIC BLOOD PRESSURE: 60 MMHG | BODY MASS INDEX: 25.71 KG/M2 | WEIGHT: 160 LBS

## 2021-08-12 DIAGNOSIS — R06.02 SHORTNESS OF BREATH: ICD-10-CM

## 2021-08-12 DIAGNOSIS — K21.9 GASTROESOPHAGEAL REFLUX DISEASE WITHOUT ESOPHAGITIS: Primary | ICD-10-CM

## 2021-08-12 DIAGNOSIS — E78.2 MIXED HYPERLIPIDEMIA: ICD-10-CM

## 2021-08-12 DIAGNOSIS — R94.31 ABNORMAL EKG: ICD-10-CM

## 2021-08-12 DIAGNOSIS — E55.9 VITAMIN D DEFICIENCY: ICD-10-CM

## 2021-08-12 DIAGNOSIS — R73.9 HYPERGLYCEMIA: ICD-10-CM

## 2021-08-12 PROCEDURE — 99215 OFFICE O/P EST HI 40 MIN: CPT | Performed by: FAMILY MEDICINE

## 2021-08-12 RX ORDER — AMLODIPINE BESYLATE 10 MG/1
10 TABLET ORAL DAILY
Qty: 90 TABLET | Refills: 1 | Status: SHIPPED | OUTPATIENT
Start: 2021-08-12 | End: 2021-11-01

## 2021-08-12 RX ORDER — ROSUVASTATIN CALCIUM 10 MG/1
10 TABLET, COATED ORAL NIGHTLY
Qty: 90 TABLET | Refills: 1 | Status: SHIPPED | OUTPATIENT
Start: 2021-08-12 | End: 2021-11-01 | Stop reason: DRUGHIGH

## 2021-08-12 NOTE — PROGRESS NOTES
Elisabet Gilliam is a 67year old male here for Patient presents with:  Heartburn: Burping and eating makes discomfort better  Diabetes: Since being off medication sugars have been averaging 125-168      HPI:       1.  Gastroesophageal reflux disease without MOUTH TWICE DAILY AS NEEDED FOR PAIN. 90 tablet 1   • SITagliptin Phosphate 50 MG Oral Tab Take 50 mg by mouth daily.  (Patient not taking: Reported on 8/12/2021 )     • hydrOXYzine HCl 25 MG Oral Tab Take 0.5-1 tablets (12.5-25 mg total) by mouth nightly a The patient is asked to return in 6-8 wks.     Orders This Visit:  Orders Placed This Encounter      CBC With Differential With Platelet      Comp Metabolic Panel (14)      Lipid Panel      Hemoglobin A1C      Vitamin D      Meds This Visit:  Requested

## 2021-08-12 NOTE — PATIENT INSTRUCTIONS
Call to schedule stress test    Try low dose of hydroxyzine for sleep - if too sleepy or groggy, let me know, we can change it    Go to lab in 4 weeks for fasting bloodwork    Call to schedule appt with gastro - can discuss if he should have colonoscopy

## 2021-08-18 NOTE — PROGRESS NOTES
SHOULDER EVALUATION:   Referring Physician: Dr. Manya Mcardle  Diagnosis: Chronic left shoulder pain (Y27.349,K63.77)     Date of Service: 8/18/2021     PATIENT [address] [place] [address] is a 67year old male who presents to therapy today with complaints of left moderate left GH joint hypomobility with grade 3 inferior/anterior glides when compared to his contralateral side. He shows left shoulder weakness with pain most noted into ER and abduction resistance.  Leandra Betancourt shows decreased left shoulder abduction AROM, bu Flexion: R; 5/5; L; 5/5  Extension: R; 5/5; L; 5/5  Supination: R; 5/5; L; 5/5  Pronation: R; 5/5; L; 5/5    Mid trap: R; 4-/5; L; 4-/5   Lats: R; 4/5, L; 4-/5  Low trap: R; 4/5; L; 4/5     Special tests:     Labrum Special Testing:   Biceps Load II: R; - mid/low trap strength to 4+/5 to promote improved shoulder mechanics and stabilization with lifting and reaching  · Pt will put his shirt on and off with < or = to VAS 1/10 left shoulder pain  · Pt will put his seat belt on with < or = to VAS 1/10 left luis

## 2021-08-19 ENCOUNTER — OFFICE VISIT (OUTPATIENT)
Dept: PHYSICAL THERAPY | Age: 76
End: 2021-08-19
Attending: FAMILY MEDICINE
Payer: MEDICARE

## 2021-08-19 DIAGNOSIS — M25.512 CHRONIC LEFT SHOULDER PAIN: ICD-10-CM

## 2021-08-19 DIAGNOSIS — G89.29 CHRONIC LEFT SHOULDER PAIN: ICD-10-CM

## 2021-08-19 PROCEDURE — 97161 PT EVAL LOW COMPLEX 20 MIN: CPT

## 2021-08-19 PROCEDURE — 97110 THERAPEUTIC EXERCISES: CPT

## 2021-08-24 ENCOUNTER — HOSPITAL ENCOUNTER (OUTPATIENT)
Dept: CV DIAGNOSTICS | Facility: HOSPITAL | Age: 76
Discharge: HOME | End: 2021-08-24
Attending: FAMILY MEDICINE
Payer: MEDICARE

## 2021-08-24 DIAGNOSIS — R06.02 SHORTNESS OF BREATH: ICD-10-CM

## 2021-08-24 DIAGNOSIS — R94.31 ABNORMAL EKG: ICD-10-CM

## 2021-08-24 PROCEDURE — 93017 CV STRESS TEST TRACING ONLY: CPT | Performed by: FAMILY MEDICINE

## 2021-08-24 PROCEDURE — 78452 HT MUSCLE IMAGE SPECT MULT: CPT | Performed by: FAMILY MEDICINE

## 2021-08-24 PROCEDURE — 93018 CV STRESS TEST I&R ONLY: CPT | Performed by: FAMILY MEDICINE

## 2021-08-25 ENCOUNTER — OFFICE VISIT (OUTPATIENT)
Dept: PHYSICAL THERAPY | Age: 76
End: 2021-08-25
Attending: FAMILY MEDICINE
Payer: MEDICARE

## 2021-08-25 PROCEDURE — 97110 THERAPEUTIC EXERCISES: CPT

## 2021-08-25 PROCEDURE — 97140 MANUAL THERAPY 1/> REGIONS: CPT

## 2021-08-25 NOTE — PROGRESS NOTES
Dx: Chronic left shoulder pain (M25.512,G89.29)       Insurance (Authorized # of Visits):  6           Authorizing Physician: Dr. Ej Leal  Next MD visit: none scheduled  Fall Risk: standard         Precautions: n/a             Subjective:  The patient comes i promote improved shoulder mechanics and stabilization with lifting and reaching  · Pt will put his shirt on and off with < or = to VAS 1/10 left shoulder pain  · Pt will put his seat belt on with < or = to VAS 1/10 left shoulder pain for three consecutive

## 2021-08-27 ENCOUNTER — OFFICE VISIT (OUTPATIENT)
Dept: PHYSICAL THERAPY | Age: 76
End: 2021-08-27
Attending: FAMILY MEDICINE
Payer: MEDICARE

## 2021-08-27 PROCEDURE — 97140 MANUAL THERAPY 1/> REGIONS: CPT

## 2021-08-27 PROCEDURE — 97110 THERAPEUTIC EXERCISES: CPT

## 2021-08-30 NOTE — PROGRESS NOTES
Dx: Chronic left shoulder pain (M25.512,G89.29)       Insurance (Authorized # of Visits):  6           Authorizing Physician: Dr. Laly Weinberg          Next MD visit: none scheduled  Fall Risk: standard         Precautions: n/a              Subjective:  The patien mid/low trap strength to 4+/5 to promote improved shoulder mechanics and stabilization with lifting and reaching  · Pt will put his shirt on and off with < or = to VAS 1/10 left shoulder pain  · Pt will put his seat belt on with < or = to VAS 1/10 left luis

## 2021-08-31 ENCOUNTER — OFFICE VISIT (OUTPATIENT)
Dept: PHYSICAL THERAPY | Age: 76
End: 2021-08-31
Attending: FAMILY MEDICINE
Payer: MEDICARE

## 2021-08-31 PROCEDURE — 97140 MANUAL THERAPY 1/> REGIONS: CPT

## 2021-08-31 PROCEDURE — 97110 THERAPEUTIC EXERCISES: CPT

## 2021-08-31 NOTE — PROGRESS NOTES
Dx: Chronic left shoulder pain (M25.512,G89.29)       Insurance (Authorized # of Visits):  6           Authorizing Physician: Dr. Gurvinder Willams          Next MD visit: none scheduled  Fall Risk: standard         Precautions: n/a              Subjective:  The patien reaching  · Pt will put his shirt on and off with < or = to VAS 1/10 left shoulder pain  · Pt will put his seat belt on with < or = to VAS 1/10 left shoulder pain for three consecutive days   · Pt will be independent and compliant with comprehensive HEP to sets  Standing L ER resistance reps w/ YTB x 10 reps, 2 sets         Wall push ups, x 10 reps, 2 sets Wall push ups, x 10 reps, 2 sets                                              Charges:  TherEx x 2, MT x 1                  Total Timed Treatment: 45 min

## 2021-09-03 ENCOUNTER — APPOINTMENT (OUTPATIENT)
Dept: PHYSICAL THERAPY | Age: 76
End: 2021-09-03
Attending: FAMILY MEDICINE
Payer: MEDICARE

## 2021-09-07 ENCOUNTER — OFFICE VISIT (OUTPATIENT)
Dept: PHYSICAL THERAPY | Age: 76
End: 2021-09-07
Attending: FAMILY MEDICINE
Payer: MEDICARE

## 2021-09-07 PROCEDURE — 97110 THERAPEUTIC EXERCISES: CPT

## 2021-09-07 NOTE — PROGRESS NOTES
Dx: Chronic left shoulder pain (M25.512,G89.29)       Insurance (Authorized # of Visits):  6           Authorizing Physician: Dr. Ronda Gallego          Next MD visit: none scheduled  Fall Risk: standard         Precautions: n/a              Subjective:  The patien shirt on and off with < or = to VAS 1/10 left shoulder pain  · Pt will put his seat belt on with < or = to VAS 1/10 left shoulder pain for three consecutive days   · Pt will be independent and compliant with comprehensive HEP to maintain progress achieved GTB/RTB x 10 reps, 2 sets  Rows w/ and w/o extension w/ GTB x 10 reps, 2 sets     BB ball overhead motions - x 10 reps --  BB ball overhead motions - x 10 reps  BB ball overhead motions - x 10 reps       Standing L ER resistance reps w/ YTB x 10 reps, 2 se

## 2021-09-09 ENCOUNTER — APPOINTMENT (OUTPATIENT)
Dept: PHYSICAL THERAPY | Age: 76
End: 2021-09-09
Attending: FAMILY MEDICINE
Payer: MEDICARE

## 2021-09-09 ENCOUNTER — OFFICE VISIT (OUTPATIENT)
Dept: PHYSICAL THERAPY | Age: 76
End: 2021-09-09
Attending: FAMILY MEDICINE
Payer: MEDICARE

## 2021-09-09 PROCEDURE — 97110 THERAPEUTIC EXERCISES: CPT

## 2021-09-09 PROCEDURE — 97140 MANUAL THERAPY 1/> REGIONS: CPT

## 2021-09-09 NOTE — PROGRESS NOTES
Dx: Chronic left shoulder pain (M25.512,G89.29)       Insurance (Authorized # of Visits):  6           Authorizing Physician: Dr. Micah Vyas          Next MD visit: none scheduled  Fall Risk: standard         Precautions: n/a              Subjective:  The patien stabilization with lifting and reaching  · Pt will put his shirt on and off with < or = to VAS 1/10 left shoulder pain  · Pt will put his seat belt on with < or = to VAS 1/10 left shoulder pain for three consecutive days   · Pt will be independent and comp lizet L shoulder flexion/abduction x 10 reps, 2 sets - 1 lb lizet  L shoulder flexion/abduction x 10 reps, 2 sets - 2 lb weight  Bilateral shoulder scaption x 10 reps, 2 sets - 1 lb weight  Bilateral shoulder scaption x 10 reps, 2 sets - 2 lb weight   Rows w/

## 2021-09-14 ENCOUNTER — OFFICE VISIT (OUTPATIENT)
Dept: PHYSICAL THERAPY | Age: 76
End: 2021-09-14
Attending: FAMILY MEDICINE
Payer: MEDICARE

## 2021-09-14 PROCEDURE — 97110 THERAPEUTIC EXERCISES: CPT

## 2021-09-14 NOTE — PROGRESS NOTES
Dx: Chronic left shoulder pain (M25.512,G89.29)       Insurance (Authorized # of Visits):  6           Authorizing Physician: Dr. Micah Vyas          Next MD visit: none scheduled  Fall Risk: standard         Precautions: n/a              Subjective:  The patien independent and compliant with comprehensive HEP to maintain progress achieved in PT         Plan: The patient will be formally reassessed at his next session, 9/16/21.   Date: 8/27/21                TX#: 3/8 Date: 8/31/21                 TX#: 4/8 Date: 9/7 w/ and w/o extension w/ GTB x 10 reps, 2 sets      Serratus wall slides w/ YTBx 10 reps Serratus wall slides w/ YTBx 10 reps      Ball on wall CW and CCW Ball on wall CW and CCW   --  BB ball overhead motions - x 10 reps  BB ball overhead motions - x 10 re

## 2021-09-15 NOTE — IMAGING NOTE
Call placed to pt regarding CTA [facility] on 9/17/21. Patient's son answered call due to language barrier and states he will be with father during procedure. Instructed to arrive at [facility] .  Instructed to drink plenty of fluids a day prior to procedure a

## 2021-09-16 ENCOUNTER — OFFICE VISIT (OUTPATIENT)
Dept: PHYSICAL THERAPY | Age: 76
End: 2021-09-16
Attending: FAMILY MEDICINE
Payer: MEDICARE

## 2021-09-16 PROCEDURE — 97110 THERAPEUTIC EXERCISES: CPT

## 2021-09-16 NOTE — PROGRESS NOTES
Discharge Summary    Pt has attended 8 visits in Physical Therapy. Subjective: The patient comes into therapy with no pain. Lorraine Watkins states his left shoulder feels 80-90% improvement since beginning therapy.  He is able to lay on his left side for flexibility, and functional ability since starting his POC. Xavier De La O was able to meet many of his functional goals today. He is competent managing his HEP after review and was given additional exercises to work on his hip/knee strength.  Xavier De La O was in agreement progress - 8 mins -- --   --  Sitting bilateral ER resistance reps w/ YTB x 10 reps, 2 sets -- LBW/MW w/ GTB --    Side lying ER reps, x 10 reps, 2 sets - 1 lb weight   Side lying ER reps, x 10 reps, 2 sets - 1 lb weight  Side lying ER reps, x 10 reps, 2 s

## 2021-09-17 ENCOUNTER — APPOINTMENT (OUTPATIENT)
Dept: CT IMAGING | Facility: HOSPITAL | Age: 76
End: 2021-09-17
Attending: INTERNAL MEDICINE
Payer: MEDICARE

## 2021-09-17 ENCOUNTER — HOSPITAL ENCOUNTER (OUTPATIENT)
Dept: CT IMAGING | Facility: HOSPITAL | Age: 76
Discharge: HOME | End: 2021-09-17
Attending: INTERNAL MEDICINE
Payer: MEDICARE

## 2021-09-27 ENCOUNTER — HOSPITAL ENCOUNTER (OUTPATIENT)
Dept: CT IMAGING | Facility: HOSPITAL | Age: 76
Discharge: HOME | End: 2021-09-27
Attending: INTERNAL MEDICINE
Payer: MEDICARE

## 2021-09-27 VITALS — BODY MASS INDEX: 25.07 KG/M2 | WEIGHT: 156 LBS | HEIGHT: 66 IN

## 2021-09-27 DIAGNOSIS — I20.8 ANGINAL EQUIVALENT (HCC): ICD-10-CM

## 2021-09-27 PROCEDURE — 0502T CTA FRACTIONAL FLOW RESERVE ANALYSIS (CPT=0503T/0502T): CPT | Performed by: INTERNAL MEDICINE

## 2021-09-27 PROCEDURE — 75574 CT ANGIO HRT W/3D IMAGE: CPT | Performed by: INTERNAL MEDICINE

## 2021-09-27 PROCEDURE — 82565 ASSAY OF CREATININE: CPT

## 2021-09-27 PROCEDURE — 0503T CTA FRACTIONAL FLOW RESERVE ANALYSIS (CPT=0503T/0502T): CPT | Performed by: INTERNAL MEDICINE

## 2021-09-27 RX ORDER — DILTIAZEM HYDROCHLORIDE 5 MG/ML
5 INJECTION INTRAVENOUS SEE ADMIN INSTRUCTIONS
Status: DISCONTINUED | OUTPATIENT
Start: 2021-09-27 | End: 2021-09-29

## 2021-09-27 RX ORDER — METOPROLOL TARTRATE 5 MG/5ML
INJECTION INTRAVENOUS
Status: DISCONTINUED
Start: 2021-09-27 | End: 2021-09-27

## 2021-09-27 RX ORDER — DILTIAZEM HYDROCHLORIDE 5 MG/ML
INJECTION INTRAVENOUS
Status: DISCONTINUED
Start: 2021-09-27 | End: 2021-09-27

## 2021-09-27 RX ORDER — METOPROLOL TARTRATE 5 MG/5ML
5 INJECTION INTRAVENOUS SEE ADMIN INSTRUCTIONS
Status: DISCONTINUED | OUTPATIENT
Start: 2021-09-27 | End: 2021-09-29

## 2021-09-27 RX ORDER — NITROGLYCERIN 0.4 MG/1
0.4 TABLET SUBLINGUAL ONCE
Status: COMPLETED | OUTPATIENT
Start: 2021-09-27 | End: 2021-09-27

## 2021-09-27 RX ADMIN — NITROGLYCERIN 0.8 MG: 0.4 TABLET SUBLINGUAL at 12:23:00

## 2021-09-27 RX ADMIN — METOPROLOL TARTRATE 5 MG: 5 INJECTION INTRAVENOUS at 11:44:00

## 2021-09-27 RX ADMIN — METOPROLOL TARTRATE 5 MG: 5 INJECTION INTRAVENOUS at 11:53:00

## 2021-09-27 RX ADMIN — METOPROLOL TARTRATE 5 MG: 5 INJECTION INTRAVENOUS at 11:32:00

## 2021-09-27 RX ADMIN — METOPROLOL TARTRATE 5 MG: 5 INJECTION INTRAVENOUS at 11:27:00

## 2021-09-27 RX ADMIN — DILTIAZEM HYDROCHLORIDE 5 MG: 5 INJECTION INTRAVENOUS at 12:22:00

## 2021-09-27 RX ADMIN — DILTIAZEM HYDROCHLORIDE 5 MG: 5 INJECTION INTRAVENOUS at 12:10:00

## 2021-09-27 RX ADMIN — DILTIAZEM HYDROCHLORIDE 5 MG: 5 INJECTION INTRAVENOUS at 12:05:00

## 2021-09-27 NOTE — IMAGING NOTE
TO RAD HOLDING AT 1002      HX TAKEN :DYSPNEA/SOB    PT CONSENTED AT 1020     BASELINE VITAL SIGNS   HR 63  /83 BMI 25.2 /  LB    CTA ORDERED BY DR MONTOYA  WAS PT GIVEN CTA  PREMEDS NO, IF YES METOPROLOL 50 MG PO X 2 DOSES    METOPROLOL PO GIVEN 5

## 2021-09-29 ENCOUNTER — OFFICE VISIT (OUTPATIENT)
Dept: FAMILY MEDICINE CLINIC | Facility: CLINIC | Age: 76
End: 2021-09-29
Payer: MEDICARE

## 2021-09-29 VITALS
DIASTOLIC BLOOD PRESSURE: 70 MMHG | TEMPERATURE: 97 F | BODY MASS INDEX: 24.75 KG/M2 | HEART RATE: 74 BPM | SYSTOLIC BLOOD PRESSURE: 122 MMHG | OXYGEN SATURATION: 99 % | WEIGHT: 154 LBS | HEIGHT: 66 IN

## 2021-09-29 DIAGNOSIS — M25.511 CHRONIC RIGHT SHOULDER PAIN: ICD-10-CM

## 2021-09-29 DIAGNOSIS — M25.512 CHRONIC PAIN OF BOTH SHOULDERS: ICD-10-CM

## 2021-09-29 DIAGNOSIS — M25.511 CHRONIC PAIN OF BOTH SHOULDERS: ICD-10-CM

## 2021-09-29 DIAGNOSIS — R73.9 HYPERGLYCEMIA: Primary | ICD-10-CM

## 2021-09-29 DIAGNOSIS — K21.9 GASTROESOPHAGEAL REFLUX DISEASE WITHOUT ESOPHAGITIS: ICD-10-CM

## 2021-09-29 DIAGNOSIS — R20.2 PARESTHESIAS: ICD-10-CM

## 2021-09-29 DIAGNOSIS — E55.9 VITAMIN D DEFICIENCY: ICD-10-CM

## 2021-09-29 DIAGNOSIS — R94.31 ABNORMAL EKG: ICD-10-CM

## 2021-09-29 DIAGNOSIS — G89.29 CHRONIC RIGHT SHOULDER PAIN: ICD-10-CM

## 2021-09-29 DIAGNOSIS — G89.29 CHRONIC PAIN OF BOTH SHOULDERS: ICD-10-CM

## 2021-09-29 DIAGNOSIS — R06.02 SHORTNESS OF BREATH: ICD-10-CM

## 2021-09-29 PROCEDURE — 99215 OFFICE O/P EST HI 40 MIN: CPT | Performed by: FAMILY MEDICINE

## 2021-09-29 RX ORDER — LANCETS 28 GAUGE
1 EACH MISCELLANEOUS DAILY
Qty: 100 EACH | Refills: 1 | Status: SHIPPED | OUTPATIENT
Start: 2021-09-29 | End: 2021-11-01

## 2021-09-29 RX ORDER — BLOOD-GLUCOSE METER
KIT MISCELLANEOUS
Qty: 100 STRIP | Refills: 1 | Status: SHIPPED | OUTPATIENT
Start: 2021-09-29 | End: 2021-11-01

## 2021-09-29 RX ORDER — BLOOD-GLUCOSE METER
1 KIT MISCELLANEOUS DAILY
Qty: 1 KIT | Refills: 0 | Status: SHIPPED | OUTPATIENT
Start: 2021-09-29 | End: 2021-12-02

## 2021-09-29 NOTE — PROGRESS NOTES
Prashant Diaz is a 67year old male here for Patient presents with:  Hyperglycemia: Sugars at home are ranging from 121 to 159 fasting      HPI:       1. Hyperglycemia  -sugars ranging 120-130s fasting  -trying to eat healthy    2.  Chronic pain of both sh lancet by Finger stick route daily. Use as directed. 100 each 1   • Glucose Blood (FREESTYLE LITE TEST) In Vitro Strip Use as directed once daily.  Dx: 11.65 100 strip 1   • Cholecalciferol (VITAMIN D3) 1.25 MG (50573 UT) Oral Tab Take 1 tablet by mouth onc round  Pulm: CTAB, no wheezing  CV: RRR  Ext: full ROM  Psych: normal affect     ASSESSMENT/PLAN:     1. Hyperglycemia  -check a1c  -dietary modification for now  -would conisder metformin in future if needed    2. Chronic pain of both shoulders  3.  Chroni counseling/coordinating care regarding shoulder pain,hyperglycemia, vit d def

## 2021-09-29 NOTE — PATIENT INSTRUCTIONS
Go to lab for fasting bloodwork    Continue to try to eat healthy and stay active    Try to see what formulations of vit D are available at pharmacy    Followup in early November

## 2021-09-30 NOTE — TELEPHONE ENCOUNTER
Requested Prescriptions     Pending Prescriptions Disp Refills   • HYDROXYZINE 25 MG Oral Tab [Pharmacy Med Name: HYDROXYZINE HCL 25MG TABS (WHITE)] 30 tablet 1     Sig: TAKE 1/2 TO 1 TABLET(12.5 TO 25 MG) BY MOUTH EVERY NIGHT AS NEEDED FOR ANXIETY OR SLEE

## 2021-10-01 ENCOUNTER — TELEPHONE (OUTPATIENT)
Dept: FAMILY MEDICINE CLINIC | Facility: CLINIC | Age: 76
End: 2021-10-01

## 2021-10-01 RX ORDER — HYDROXYZINE HYDROCHLORIDE 25 MG/1
TABLET, FILM COATED ORAL
Qty: 30 TABLET | Refills: 1 | Status: SHIPPED | OUTPATIENT
Start: 2021-10-01 | End: 2021-11-01

## 2021-10-01 NOTE — TELEPHONE ENCOUNTER
Received a prior auth request for hydroxyzine.     [facility] (Key: [id number])     Your information has been sent to [facility]

## 2021-10-04 NOTE — TELEPHONE ENCOUNTER
HYDROXYZINE 25 MG Oral Tab 30 tablet 1 10/1/2021    Sig: Robin James 1/2 TO 1 TABLET(12.5 TO 25 MG) BY MOUTH EVERY NIGHT AS NEEDED FOR ANXIETY OR SLEEP       PA has been DENIED  QY#[id number]  CASE# [id number]  [facility]  [phone]    $29.00 Out of pocket.

## 2021-10-08 ENCOUNTER — PATIENT MESSAGE (OUTPATIENT)
Dept: FAMILY MEDICINE CLINIC | Facility: CLINIC | Age: 76
End: 2021-10-08

## 2021-10-08 ENCOUNTER — LAB ENCOUNTER (OUTPATIENT)
Dept: LAB | Age: 76
End: 2021-10-08
Attending: FAMILY MEDICINE
Payer: MEDICARE

## 2021-10-08 DIAGNOSIS — M25.511 CHRONIC PAIN OF BOTH SHOULDERS: ICD-10-CM

## 2021-10-08 DIAGNOSIS — R20.2 PARESTHESIAS: ICD-10-CM

## 2021-10-08 DIAGNOSIS — R06.02 SHORTNESS OF BREATH: ICD-10-CM

## 2021-10-08 DIAGNOSIS — R73.9 HYPERGLYCEMIA: ICD-10-CM

## 2021-10-08 DIAGNOSIS — M25.512 CHRONIC PAIN OF BOTH SHOULDERS: ICD-10-CM

## 2021-10-08 DIAGNOSIS — G89.29 CHRONIC PAIN OF BOTH SHOULDERS: ICD-10-CM

## 2021-10-08 DIAGNOSIS — E78.2 MIXED HYPERLIPIDEMIA: ICD-10-CM

## 2021-10-08 DIAGNOSIS — E55.9 VITAMIN D DEFICIENCY: ICD-10-CM

## 2021-10-08 PROCEDURE — 86038 ANTINUCLEAR ANTIBODIES: CPT

## 2021-10-08 PROCEDURE — 80061 LIPID PANEL: CPT

## 2021-10-08 PROCEDURE — 83735 ASSAY OF MAGNESIUM: CPT

## 2021-10-08 PROCEDURE — 82306 VITAMIN D 25 HYDROXY: CPT

## 2021-10-08 PROCEDURE — 84550 ASSAY OF BLOOD/URIC ACID: CPT

## 2021-10-08 PROCEDURE — 36415 COLL VENOUS BLD VENIPUNCTURE: CPT

## 2021-10-08 PROCEDURE — 83036 HEMOGLOBIN GLYCOSYLATED A1C: CPT

## 2021-10-08 PROCEDURE — 85025 COMPLETE CBC W/AUTO DIFF WBC: CPT

## 2021-10-08 PROCEDURE — 82607 VITAMIN B-12: CPT

## 2021-10-08 PROCEDURE — 80053 COMPREHEN METABOLIC PANEL: CPT

## 2021-10-08 PROCEDURE — 86431 RHEUMATOID FACTOR QUANT: CPT

## 2021-10-08 PROCEDURE — 86140 C-REACTIVE PROTEIN: CPT

## 2021-10-08 PROCEDURE — 82746 ASSAY OF FOLIC ACID SERUM: CPT

## 2021-10-08 PROCEDURE — 86200 CCP ANTIBODY: CPT

## 2021-10-08 PROCEDURE — 85652 RBC SED RATE AUTOMATED: CPT

## 2021-10-11 ENCOUNTER — PATIENT MESSAGE (OUTPATIENT)
Dept: FAMILY MEDICINE CLINIC | Facility: CLINIC | Age: 76
End: 2021-10-11

## 2021-10-11 NOTE — TELEPHONE ENCOUNTER
From: [address]  To: Kateryna Moon MD  Sent: 10/8/2021 9:53 PM CDT  Subject: Blood work, B12, vitamin D and others    Hi Dr. Param Ocampo got his test done and I see results are in.  Please send prescription for any medication or shots he need like

## 2021-10-11 NOTE — TELEPHONE ENCOUNTER
From: [address]  To: Estrellita Zuniga MD  Sent: 10/8/2021 10:12 PM CDT  Subject: Question regarding HEMOGLOBIN A1C    Please suggest medication per our conversation that it should be less than 6.7  Thanks

## 2021-10-12 NOTE — TELEPHONE ENCOUNTER
From: [address]  To: Aniket Schwab MD  Sent: 10/11/2021 11:13 PM CDT  Subject: HydrOXYzine denied - out of stock     Hi,  This medication has been denied by insurance and sent a letter to us at home.  He need this medication as he can not sleep witho

## 2021-10-17 ENCOUNTER — PATIENT MESSAGE (OUTPATIENT)
Dept: FAMILY MEDICINE CLINIC | Facility: CLINIC | Age: 76
End: 2021-10-17

## 2021-10-18 RX ORDER — METFORMIN HYDROCHLORIDE 500 MG/1
TABLET, EXTENDED RELEASE ORAL
Qty: 60 TABLET | Refills: 1 | Status: SHIPPED | OUTPATIENT
Start: 2021-10-18 | End: 2021-10-19

## 2021-10-18 NOTE — TELEPHONE ENCOUNTER
From: [address]  To: Dayanara Munguia MD  Sent: 10/8/2021 10:12 PM CDT  Subject: Question regarding HEMOGLOBIN A1C    Please suggest medication per our conversation that it should be less than 6.7  Thanks

## 2021-10-19 RX ORDER — METFORMIN HYDROCHLORIDE 500 MG/1
TABLET, EXTENDED RELEASE ORAL
Qty: 172 TABLET | Refills: 0 | Status: SHIPPED | OUTPATIENT
Start: 2021-10-19 | End: 2021-11-01

## 2021-10-19 RX ORDER — METFORMIN HYDROCHLORIDE 500 MG/1
TABLET, EXTENDED RELEASE ORAL
Qty: 163 TABLET | Refills: 0 | Status: SHIPPED | OUTPATIENT
Start: 2021-10-19 | End: 2021-10-19

## 2021-10-19 RX ORDER — METFORMIN HYDROCHLORIDE 500 MG/1
TABLET, EXTENDED RELEASE ORAL
Qty: 173 TABLET | Refills: 0 | OUTPATIENT
Start: 2021-10-19

## 2021-11-01 ENCOUNTER — OFFICE VISIT (OUTPATIENT)
Dept: FAMILY MEDICINE CLINIC | Facility: CLINIC | Age: 76
End: 2021-11-01
Payer: MEDICARE

## 2021-11-01 VITALS
HEART RATE: 80 BPM | SYSTOLIC BLOOD PRESSURE: 110 MMHG | HEIGHT: 66 IN | DIASTOLIC BLOOD PRESSURE: 60 MMHG | TEMPERATURE: 97 F | BODY MASS INDEX: 24.43 KG/M2 | WEIGHT: 152 LBS

## 2021-11-01 DIAGNOSIS — K21.9 GASTROESOPHAGEAL REFLUX DISEASE WITHOUT ESOPHAGITIS: ICD-10-CM

## 2021-11-01 DIAGNOSIS — E11.9 TYPE 2 DIABETES MELLITUS WITHOUT COMPLICATION, WITHOUT LONG-TERM CURRENT USE OF INSULIN (HCC): Primary | ICD-10-CM

## 2021-11-01 DIAGNOSIS — M25.511 CHRONIC PAIN OF BOTH SHOULDERS: ICD-10-CM

## 2021-11-01 DIAGNOSIS — G89.29 CHRONIC PAIN OF BOTH SHOULDERS: ICD-10-CM

## 2021-11-01 DIAGNOSIS — M25.512 CHRONIC PAIN OF BOTH SHOULDERS: ICD-10-CM

## 2021-11-01 DIAGNOSIS — R94.31 ABNORMAL EKG: ICD-10-CM

## 2021-11-01 DIAGNOSIS — R07.89 ATYPICAL CHEST PAIN: ICD-10-CM

## 2021-11-01 PROBLEM — H04.123 DRY EYE SYNDROME OF BILATERAL LACRIMAL GLANDS: Status: ACTIVE | Noted: 2017-04-01

## 2021-11-01 PROCEDURE — 99215 OFFICE O/P EST HI 40 MIN: CPT | Performed by: FAMILY MEDICINE

## 2021-11-01 RX ORDER — LANCETS 28 GAUGE
1 EACH MISCELLANEOUS DAILY
Qty: 100 EACH | Refills: 1 | Status: SHIPPED | OUTPATIENT
Start: 2021-11-01 | End: 2021-12-02

## 2021-11-01 RX ORDER — METFORMIN HYDROCHLORIDE 500 MG/1
1000 TABLET, EXTENDED RELEASE ORAL
Qty: 180 TABLET | Refills: 1 | Status: SHIPPED | OUTPATIENT
Start: 2021-11-01

## 2021-11-01 RX ORDER — ROSUVASTATIN CALCIUM 20 MG/1
20 TABLET, COATED ORAL NIGHTLY
Qty: 90 TABLET | Refills: 1 | Status: SHIPPED | OUTPATIENT
Start: 2021-11-01

## 2021-11-01 RX ORDER — HYDROXYZINE HYDROCHLORIDE 25 MG/1
25 TABLET, FILM COATED ORAL NIGHTLY
Qty: 90 TABLET | Refills: 1 | Status: SHIPPED | OUTPATIENT
Start: 2021-11-01

## 2021-11-01 RX ORDER — BLOOD-GLUCOSE METER
KIT MISCELLANEOUS
Qty: 100 STRIP | Refills: 1 | Status: SHIPPED | OUTPATIENT
Start: 2021-11-01 | End: 2021-12-02

## 2021-11-01 RX ORDER — AMLODIPINE BESYLATE 10 MG/1
10 TABLET ORAL DAILY
Qty: 90 TABLET | Refills: 1 | Status: SHIPPED | OUTPATIENT
Start: 2021-11-01

## 2021-11-10 ENCOUNTER — TELEPHONE (OUTPATIENT)
Dept: FAMILY MEDICINE CLINIC | Facility: CLINIC | Age: 76
End: 2021-11-10

## 2021-11-10 NOTE — TELEPHONE ENCOUNTER
Last a1c 7.4 - recently diagnosed with DM - please call pharmacy to see if adding diagnosis will help coverage    Also see if changing brands will help get covered    Thanks

## 2021-11-10 NOTE — TELEPHONE ENCOUNTER
Phoned pharmacy. States a medicare form CMS needs to be filled out. They will fax. Informed staff to be watching for form.

## 2021-11-10 NOTE — TELEPHONE ENCOUNTER
Hansel oJyce (son) is calling because the pharmacy is requesting a prior auth, for Jessica's  test strips, he is leaving for out of [place] country tomorrow and would like to know what he can do to get his strips, Please call Hansel Joyce at [phone]

## 2021-11-10 NOTE — TELEPHONE ENCOUNTER
[facility] through [facility] primary plan or medicare benefits. Advised not PA option available. Patient will have to pay out of pocket if he wants DM supplies.  She suggested he could purchase [employer] brand for cheaper alter

## 2021-11-12 NOTE — TELEPHONE ENCOUNTER
A form has never been received from pharmacy. Attempted several times during the day to contact the pharmacy but was on hold for long periods of time. Unable to reach pharmacy. Will try again.

## 2021-12-02 ENCOUNTER — TELEPHONE (OUTPATIENT)
Dept: FAMILY MEDICINE CLINIC | Facility: CLINIC | Age: 76
End: 2021-12-02

## 2021-12-02 DIAGNOSIS — E11.9 TYPE 2 DIABETES MELLITUS WITHOUT COMPLICATION, WITHOUT LONG-TERM CURRENT USE OF INSULIN (HCC): Primary | ICD-10-CM

## 2021-12-02 RX ORDER — BLOOD-GLUCOSE METER
1 KIT MISCELLANEOUS DAILY
Qty: 1 KIT | Refills: 0 | Status: SHIPPED | OUTPATIENT
Start: 2021-12-02 | End: 2022-12-02

## 2021-12-02 RX ORDER — LANCETS 28 GAUGE
1 EACH MISCELLANEOUS DAILY
Qty: 100 EACH | Refills: 1 | Status: SHIPPED | OUTPATIENT
Start: 2021-12-02 | End: 2022-12-02

## 2021-12-02 RX ORDER — BLOOD-GLUCOSE METER
KIT MISCELLANEOUS
Qty: 100 STRIP | Refills: 1 | Status: SHIPPED | OUTPATIENT
Start: 2021-12-02

## 2021-12-03 NOTE — PROGRESS NOTES
Khushboo Gomez is a 67year old male here for Patient presents with:  Medication Request      HPI:       1.  Type 2 diabetes mellitus without complication, without long-term current use of insulin (HCC)  -sugars controlled  -needs refill of metformin - tole External Gel Apply 4 g topically 3 (three) times daily as needed.  100 g 2   • metFORMIN HCl  MG Oral Tablet 24 Hr Take 2 tablets (1,000 mg total) by mouth daily with breakfast. 180 tablet 1   • rosuvastatin 20 MG Oral Tab Take 1 tablet (20 mg total) Atypical chest pain  -improved  -unremarkable cardiac workup  -f/u prn            The patient is asked to return in 6 months, sooner prn. Orders This Visit:  No orders of the defined types were placed in this encounter.       Meds This Visit:  Requested

## 2021-12-06 ENCOUNTER — TELEPHONE (OUTPATIENT)
Dept: FAMILY MEDICINE CLINIC | Facility: CLINIC | Age: 76
End: 2021-12-06

## 2021-12-06 NOTE — TELEPHONE ENCOUNTER
Per Kimberly, DM supplies are still being denied per health plan. Dx codes provided were E11.9 and E11.65 and codes are still being denied as it states this is not a covered benefit. Called health plan to attempt SANDRINE Hobbs  [phone]  ID# [phone]

## 2021-12-06 NOTE — TELEPHONE ENCOUNTER
Per Kimberly, DM supplies are still being denied per health plan. Dx codes provided were E11.9 and E11.65 and codes are still being denied as it states this is not a covered benefit. Called health plan to attempt SANDRINE Saleh  [phone]  ID# [phone]

## 2021-12-06 NOTE — TELEPHONE ENCOUNTER
Simone Mcnair son is calling regarding his dad's diabetic strips he has a question for the nurse, please call Simone Mcnair at [phone]

## 2022-05-16 DIAGNOSIS — E11.9 TYPE 2 DIABETES MELLITUS WITHOUT COMPLICATION, WITHOUT LONG-TERM CURRENT USE OF INSULIN (HCC): ICD-10-CM

## 2022-05-18 RX ORDER — BLOOD-GLUCOSE METER
KIT MISCELLANEOUS
Qty: 100 STRIP | Refills: 1 | Status: SHIPPED | OUTPATIENT
Start: 2022-05-18

## 2022-05-18 RX ORDER — LANCETS 28 GAUGE
1 EACH MISCELLANEOUS DAILY
Qty: 100 EACH | Refills: 1 | Status: SHIPPED | OUTPATIENT
Start: 2022-05-18 | End: 2023-05-18

## 2022-06-28 ENCOUNTER — TELEPHONE (OUTPATIENT)
Dept: FAMILY MEDICINE CLINIC | Facility: CLINIC | Age: 77
End: 2022-06-28

## 2022-06-28 DIAGNOSIS — E11.9 TYPE 2 DIABETES MELLITUS WITHOUT COMPLICATION, WITHOUT LONG-TERM CURRENT USE OF INSULIN (HCC): ICD-10-CM

## 2022-06-28 RX ORDER — BLOOD-GLUCOSE METER
1 KIT MISCELLANEOUS DAILY
Qty: 1 KIT | Refills: 0 | Status: SHIPPED | OUTPATIENT
Start: 2022-06-28 | End: 2023-06-28

## 2022-06-28 RX ORDER — LANCETS 28 GAUGE
1 EACH MISCELLANEOUS DAILY
Qty: 100 EACH | Refills: 1 | Status: SHIPPED | OUTPATIENT
Start: 2022-06-28 | End: 2023-06-28

## 2022-06-28 RX ORDER — BLOOD-GLUCOSE METER
KIT MISCELLANEOUS
Qty: 100 STRIP | Refills: 1 | Status: SHIPPED | OUTPATIENT
Start: 2022-06-28

## 2022-07-25 ENCOUNTER — OFFICE VISIT (OUTPATIENT)
Dept: FAMILY MEDICINE CLINIC | Facility: CLINIC | Age: 77
End: 2022-07-25
Payer: MEDICARE

## 2022-07-25 VITALS
OXYGEN SATURATION: 96 % | DIASTOLIC BLOOD PRESSURE: 60 MMHG | BODY MASS INDEX: 24.11 KG/M2 | SYSTOLIC BLOOD PRESSURE: 110 MMHG | HEART RATE: 88 BPM | HEIGHT: 66.26 IN | TEMPERATURE: 97 F | WEIGHT: 150 LBS

## 2022-07-25 DIAGNOSIS — Z00.00 ENCOUNTER FOR ANNUAL HEALTH EXAMINATION: Primary | ICD-10-CM

## 2022-07-25 DIAGNOSIS — R07.89 ATYPICAL CHEST PAIN: ICD-10-CM

## 2022-07-25 DIAGNOSIS — N35.919 STRICTURE OF MALE URETHRA, UNSPECIFIED STRICTURE TYPE: ICD-10-CM

## 2022-07-25 DIAGNOSIS — E78.2 MIXED HYPERLIPIDEMIA: ICD-10-CM

## 2022-07-25 DIAGNOSIS — M25.511 CHRONIC PAIN OF BOTH SHOULDERS: ICD-10-CM

## 2022-07-25 DIAGNOSIS — E11.9 TYPE 2 DIABETES MELLITUS WITHOUT COMPLICATION, WITHOUT LONG-TERM CURRENT USE OF INSULIN (HCC): ICD-10-CM

## 2022-07-25 DIAGNOSIS — G89.29 CHRONIC PAIN OF BOTH SHOULDERS: ICD-10-CM

## 2022-07-25 DIAGNOSIS — M25.512 CHRONIC PAIN OF BOTH SHOULDERS: ICD-10-CM

## 2022-07-25 DIAGNOSIS — K21.9 GASTROESOPHAGEAL REFLUX DISEASE WITHOUT ESOPHAGITIS: ICD-10-CM

## 2022-07-25 DIAGNOSIS — E55.9 VITAMIN D DEFICIENCY: ICD-10-CM

## 2022-07-25 RX ORDER — AMITRIPTYLINE HYDROCHLORIDE 25 MG/1
25 TABLET, FILM COATED ORAL NIGHTLY
Qty: 90 TABLET | Refills: 1 | Status: SHIPPED | OUTPATIENT
Start: 2022-07-25

## 2022-07-25 RX ORDER — FAMOTIDINE 20 MG/1
20 TABLET, FILM COATED ORAL NIGHTLY
COMMUNITY
End: 2022-07-25

## 2022-07-25 RX ORDER — AMLODIPINE BESYLATE 5 MG/1
5 TABLET ORAL DAILY
Qty: 90 TABLET | Refills: 1 | Status: SHIPPED | OUTPATIENT
Start: 2022-07-25

## 2022-07-25 RX ORDER — FAMOTIDINE 20 MG/1
20 TABLET, FILM COATED ORAL NIGHTLY
Qty: 90 TABLET | Refills: 1 | Status: SHIPPED | OUTPATIENT
Start: 2022-07-25

## 2022-07-26 NOTE — TELEPHONE ENCOUNTER
Pharmacy [facility] in [place] called regarding Januvia 50 mg tablet.  They wanted to switch to 100 mg for insurance coverage

## 2022-07-27 ENCOUNTER — LAB ENCOUNTER (OUTPATIENT)
Dept: LAB | Age: 77
End: 2022-07-27
Attending: FAMILY MEDICINE
Payer: MEDICARE

## 2022-07-27 DIAGNOSIS — E55.9 VITAMIN D DEFICIENCY: ICD-10-CM

## 2022-07-27 DIAGNOSIS — E78.2 MIXED HYPERLIPIDEMIA: ICD-10-CM

## 2022-07-27 DIAGNOSIS — E11.9 TYPE 2 DIABETES MELLITUS WITHOUT COMPLICATION, WITHOUT LONG-TERM CURRENT USE OF INSULIN (HCC): ICD-10-CM

## 2022-07-27 LAB
ALBUMIN SERPL-MCNC: 4 G/DL (ref 3.4–5)
ALBUMIN/GLOB SERPL: 1.1 {RATIO} (ref 1–2)
ALP LIVER SERPL-CCNC: 86 U/L
ALT SERPL-CCNC: 21 U/L
ANION GAP SERPL CALC-SCNC: 4 MMOL/L (ref 0–18)
AST SERPL-CCNC: 19 U/L (ref 15–37)
BASOPHILS # BLD AUTO: 0.08 X10(3) UL (ref 0–0.2)
BASOPHILS NFR BLD AUTO: 1.2 %
BILIRUB SERPL-MCNC: 0.9 MG/DL (ref 0.1–2)
BUN BLD-MCNC: 17 MG/DL (ref 7–18)
CALCIUM BLD-MCNC: 9.4 MG/DL (ref 8.5–10.1)
CHLORIDE SERPL-SCNC: 104 MMOL/L (ref 98–112)
CHOLEST SERPL-MCNC: 193 MG/DL (ref ?–200)
CO2 SERPL-SCNC: 29 MMOL/L (ref 21–32)
CREAT BLD-MCNC: 1.15 MG/DL
CREAT UR-SCNC: 63.2 MG/DL
EOSINOPHIL # BLD AUTO: 0.5 X10(3) UL (ref 0–0.7)
EOSINOPHIL NFR BLD AUTO: 7.6 %
ERYTHROCYTE [DISTWIDTH] IN BLOOD BY AUTOMATED COUNT: 15.2 %
EST. AVERAGE GLUCOSE BLD GHB EST-MCNC: 146 MG/DL (ref 68–126)
FASTING PATIENT LIPID ANSWER: YES
FASTING STATUS PATIENT QL REPORTED: YES
GLOBULIN PLAS-MCNC: 3.7 G/DL (ref 2.8–4.4)
GLUCOSE BLD-MCNC: 129 MG/DL (ref 70–99)
HBA1C MFR BLD: 6.7 % (ref ?–5.7)
HCT VFR BLD AUTO: 43.5 %
HDLC SERPL-MCNC: 29 MG/DL (ref 40–59)
HGB BLD-MCNC: 13.5 G/DL
IMM GRANULOCYTES # BLD AUTO: 0.03 X10(3) UL (ref 0–1)
IMM GRANULOCYTES NFR BLD: 0.5 %
LDLC SERPL CALC-MCNC: 126 MG/DL (ref ?–100)
LYMPHOCYTES # BLD AUTO: 1.93 X10(3) UL (ref 1–4)
LYMPHOCYTES NFR BLD AUTO: 29.2 %
MCH RBC QN AUTO: 25 PG (ref 26–34)
MCHC RBC AUTO-ENTMCNC: 31 G/DL (ref 31–37)
MCV RBC AUTO: 80.4 FL
MICROALBUMIN UR-MCNC: 1.37 MG/DL
MICROALBUMIN/CREAT 24H UR-RTO: 21.7 UG/MG (ref ?–30)
MONOCYTES # BLD AUTO: 0.89 X10(3) UL (ref 0.1–1)
MONOCYTES NFR BLD AUTO: 13.5 %
NEUTROPHILS # BLD AUTO: 3.18 X10 (3) UL (ref 1.5–7.7)
NEUTROPHILS # BLD AUTO: 3.18 X10(3) UL (ref 1.5–7.7)
NEUTROPHILS NFR BLD AUTO: 48 %
NONHDLC SERPL-MCNC: 164 MG/DL (ref ?–130)
OSMOLALITY SERPL CALC.SUM OF ELEC: 287 MOSM/KG (ref 275–295)
PLATELET # BLD AUTO: 341 10(3)UL (ref 150–450)
POTASSIUM SERPL-SCNC: 4.2 MMOL/L (ref 3.5–5.1)
PROT SERPL-MCNC: 7.7 G/DL (ref 6.4–8.2)
RBC # BLD AUTO: 5.41 X10(6)UL
SODIUM SERPL-SCNC: 137 MMOL/L (ref 136–145)
TRIGL SERPL-MCNC: 214 MG/DL (ref 30–149)
VIT D+METAB SERPL-MCNC: 71.1 NG/ML (ref 30–100)
VLDLC SERPL CALC-MCNC: 39 MG/DL (ref 0–30)
WBC # BLD AUTO: 6.6 X10(3) UL (ref 4–11)

## 2022-07-27 PROCEDURE — 36415 COLL VENOUS BLD VENIPUNCTURE: CPT

## 2022-07-27 PROCEDURE — 82570 ASSAY OF URINE CREATININE: CPT

## 2022-07-27 PROCEDURE — 83036 HEMOGLOBIN GLYCOSYLATED A1C: CPT

## 2022-07-27 PROCEDURE — 80061 LIPID PANEL: CPT

## 2022-07-27 PROCEDURE — 85025 COMPLETE CBC W/AUTO DIFF WBC: CPT

## 2022-07-27 PROCEDURE — 82043 UR ALBUMIN QUANTITATIVE: CPT

## 2022-07-27 PROCEDURE — 82306 VITAMIN D 25 HYDROXY: CPT

## 2022-07-27 PROCEDURE — 80053 COMPREHEN METABOLIC PANEL: CPT

## 2022-08-02 ENCOUNTER — PATIENT MESSAGE (OUTPATIENT)
Dept: FAMILY MEDICINE CLINIC | Facility: CLINIC | Age: 77
End: 2022-08-02

## 2022-08-03 ENCOUNTER — TELEPHONE (OUTPATIENT)
Dept: FAMILY MEDICINE CLINIC | Facility: CLINIC | Age: 77
End: 2022-08-03

## 2022-08-03 RX ORDER — TRIAMCINOLONE ACETONIDE 1 MG/G
CREAM TOPICAL
Qty: 454 G | Refills: 0 | Status: SHIPPED | OUTPATIENT
Start: 2022-08-03

## 2022-08-03 RX ORDER — HYDROXYZINE HYDROCHLORIDE 25 MG/1
25 TABLET, FILM COATED ORAL NIGHTLY
Qty: 90 TABLET | Refills: 1 | Status: SHIPPED | OUTPATIENT
Start: 2022-08-03

## 2022-08-03 NOTE — TELEPHONE ENCOUNTER
Received a fax from pharmacy requesting PA for Esomeprazole. Completed through [address]. Will wait for determination.

## 2022-08-03 NOTE — TELEPHONE ENCOUNTER
From: [address]  To: Judy Maldonado MD  Sent: 8/2/2022 6:38 PM CDT  Subject: PA/Substitution required for medication. Hi Dr. Stefania Mack,    [facility] have not filled the following medications and saying it was not approved by insurance. They also said it requires a PA and also a substitute needs to be written by doctor as one of this medication is very expensive. Kindly send proper paperwork to [facility] [address] [address] with substitute option.  Also trimacinolone was not ordered and requesting to please order it.     hydrOXYzine 25 MG Tabs  Esomeprazole Magnesium 20 MG Cpdr  triamcinolone 0.1 % Crea    Thanks,    [facility]

## 2022-08-08 ENCOUNTER — TELEPHONE (OUTPATIENT)
Dept: FAMILY MEDICINE CLINIC | Facility: CLINIC | Age: 77
End: 2022-08-08

## 2022-08-08 NOTE — TELEPHONE ENCOUNTER
----- Message from Arun Stuart MD sent at 8/5/2022  9:35 AM CDT -----  Labs normal except:    Cholesterol is worse - will likely need to restart cholesterol medication - can review this upcoming cardiology appt as well - let me know if refills needed    Followup as planned

## 2022-08-26 ENCOUNTER — OFFICE VISIT (OUTPATIENT)
Dept: SURGERY | Facility: CLINIC | Age: 77
End: 2022-08-26
Payer: MEDICARE

## 2022-08-26 DIAGNOSIS — N99.111 POSTPROCEDURAL BULBOUS URETHRAL STRICTURE: Primary | ICD-10-CM

## 2022-08-26 DIAGNOSIS — N40.1 BPH WITH OBSTRUCTION/LOWER URINARY TRACT SYMPTOMS: ICD-10-CM

## 2022-08-26 DIAGNOSIS — R33.9 URINARY RETENTION: ICD-10-CM

## 2022-08-26 DIAGNOSIS — N13.8 BPH WITH OBSTRUCTION/LOWER URINARY TRACT SYMPTOMS: ICD-10-CM

## 2022-08-26 DIAGNOSIS — R82.90 URINE FINDING: ICD-10-CM

## 2022-08-26 DIAGNOSIS — R97.20 BPH WITH ELEVATED PSA: ICD-10-CM

## 2022-08-26 DIAGNOSIS — N40.0 BPH WITH ELEVATED PSA: ICD-10-CM

## 2022-08-26 LAB
APPEARANCE: CLEAR
BILIRUBIN: NEGATIVE
GLUCOSE (URINE DIPSTICK): NEGATIVE MG/DL
KETONES (URINE DIPSTICK): NEGATIVE MG/DL
MULTISTIX LOT#: ABNORMAL NUMERIC
NITRITE, URINE: NEGATIVE
OCCULT BLOOD: NEGATIVE
PH, URINE: 5.5 (ref 4.5–8)
PROTEIN (URINE DIPSTICK): NEGATIVE MG/DL
SPECIFIC GRAVITY: 1.01 (ref 1–1.03)
URINE-COLOR: YELLOW
UROBILINOGEN,SEMI-QN: 0.2 MG/DL (ref 0–1.9)

## 2022-08-26 PROCEDURE — 81003 URINALYSIS AUTO W/O SCOPE: CPT | Performed by: PHYSICIAN ASSISTANT

## 2022-08-26 PROCEDURE — 99203 OFFICE O/P NEW LOW 30 MIN: CPT | Performed by: PHYSICIAN ASSISTANT

## 2022-08-26 RX ORDER — TAMSULOSIN HYDROCHLORIDE 0.4 MG/1
0.4 CAPSULE ORAL DAILY
Qty: 30 CAPSULE | Refills: 3 | Status: SHIPPED | OUTPATIENT
Start: 2022-08-26 | End: 2022-09-25

## 2022-09-09 ENCOUNTER — PATIENT MESSAGE (OUTPATIENT)
Dept: FAMILY MEDICINE CLINIC | Facility: CLINIC | Age: 77
End: 2022-09-09

## 2022-09-09 DIAGNOSIS — Z01.10 ENCOUNTER FOR HEARING EXAMINATION, UNSPECIFIED WHETHER ABNORMAL FINDINGS: Primary | ICD-10-CM

## 2022-09-12 NOTE — TELEPHONE ENCOUNTER
From: Nasreen Fine  To: Cruz Crawford MD  Sent: 9/9/2022 4:40 PM CDT  Subject: Hearing Evaluation and Manuelita Lita Dr. Gabby Goldsmith    My father have an appointment with '[facility]' on Sep 23. They want a referral for 'Hearing Testing' from his PCP. Kindly send a referral prescription to them on FAX# [phone]. Please let me know, if I need to  that referral?     I would really appreciate it.      Thanks  eblizz  [phone]

## 2022-09-14 ENCOUNTER — PROCEDURE (OUTPATIENT)
Dept: SURGERY | Facility: CLINIC | Age: 77
End: 2022-09-14
Payer: MEDICARE

## 2022-09-14 DIAGNOSIS — R39.9 LOWER URINARY TRACT SYMPTOMS (LUTS): Primary | ICD-10-CM

## 2022-09-14 PROCEDURE — 52000 CYSTOURETHROSCOPY: CPT | Performed by: SURGERY

## 2022-09-19 ENCOUNTER — TELEPHONE (OUTPATIENT)
Dept: SURGERY | Facility: CLINIC | Age: 77
End: 2022-09-19

## 2022-09-19 NOTE — TELEPHONE ENCOUNTER
\"Can someone order this gentleman 16Fr coude CIC caths once per week? I couldn't figure out how to do it. The one he likes that he got a sample of was called \"Cure Ultra coude 16 Fr (Ultra M16C). \" And I guess this one requires associated lubrication packets. \"    RN received an order from Dr Merlin Corner. Order carried out. Due to retention, patient is to catheterize once daily for an indefinite period of time. Patient also needs a Coude catheter due to the inability to pass a straight. Iris placed and sent to [address] [place] [address] today, 9/19.

## 2022-09-30 ENCOUNTER — TELEPHONE (OUTPATIENT)
Dept: SURGERY | Facility: CLINIC | Age: 77
End: 2022-09-30

## 2022-10-11 ENCOUNTER — TELEPHONE (OUTPATIENT)
Dept: SURGERY | Facility: CLINIC | Age: 77
End: 2022-10-11

## 2022-10-11 NOTE — TELEPHONE ENCOUNTER
Pharmacy needs clarification on lidocaine instructions, amount on directions is to apply 50mL but the tube size is 50mL. Please advise thank you.

## 2022-10-17 ENCOUNTER — TELEPHONE (OUTPATIENT)
Dept: SURGERY | Facility: CLINIC | Age: 77
End: 2022-10-17

## 2022-10-17 ENCOUNTER — OFFICE VISIT (OUTPATIENT)
Dept: FAMILY MEDICINE CLINIC | Facility: CLINIC | Age: 77
End: 2022-10-17
Payer: MEDICARE

## 2022-10-17 VITALS
OXYGEN SATURATION: 96 % | SYSTOLIC BLOOD PRESSURE: 118 MMHG | WEIGHT: 149 LBS | HEIGHT: 66.26 IN | HEART RATE: 82 BPM | BODY MASS INDEX: 23.95 KG/M2 | DIASTOLIC BLOOD PRESSURE: 60 MMHG | TEMPERATURE: 97 F

## 2022-10-17 DIAGNOSIS — E78.2 MIXED HYPERLIPIDEMIA: ICD-10-CM

## 2022-10-17 DIAGNOSIS — K21.9 GASTROESOPHAGEAL REFLUX DISEASE WITHOUT ESOPHAGITIS: ICD-10-CM

## 2022-10-17 DIAGNOSIS — E11.9 TYPE 2 DIABETES MELLITUS WITHOUT COMPLICATION, WITHOUT LONG-TERM CURRENT USE OF INSULIN (HCC): Primary | ICD-10-CM

## 2022-10-17 DIAGNOSIS — K59.09 CHRONIC CONSTIPATION: ICD-10-CM

## 2022-10-17 PROBLEM — R06.02 SHORTNESS OF BREATH: Status: ACTIVE | Noted: 2021-09-02

## 2022-10-17 PROBLEM — I20.89 ATYPICAL ANGINA: Status: ACTIVE | Noted: 2021-09-24

## 2022-10-17 PROBLEM — I20.89 ATYPICAL ANGINA (HCC): Status: ACTIVE | Noted: 2021-09-24

## 2022-10-17 PROBLEM — R10.13 EPIGASTRIC ABDOMINAL PAIN: Status: ACTIVE | Noted: 2021-09-24

## 2022-10-17 PROBLEM — I20.8 ATYPICAL ANGINA (HCC): Status: ACTIVE | Noted: 2021-09-24

## 2022-10-17 PROBLEM — R94.31 ABNORMAL EKG: Status: ACTIVE | Noted: 2021-09-20

## 2022-10-17 PROCEDURE — 99214 OFFICE O/P EST MOD 30 MIN: CPT | Performed by: FAMILY MEDICINE

## 2022-10-17 RX ORDER — FAMOTIDINE 20 MG/1
20 TABLET, FILM COATED ORAL NIGHTLY
Qty: 90 TABLET | Refills: 1 | Status: SHIPPED | OUTPATIENT
Start: 2022-10-17

## 2022-10-17 RX ORDER — ROSUVASTATIN CALCIUM 20 MG/1
20 TABLET, COATED ORAL EVERY EVENING
COMMUNITY
Start: 2022-07-29 | End: 2022-10-17

## 2022-10-17 RX ORDER — AMITRIPTYLINE HYDROCHLORIDE 25 MG/1
25 TABLET, FILM COATED ORAL NIGHTLY
Qty: 90 TABLET | Refills: 1 | Status: SHIPPED | OUTPATIENT
Start: 2022-10-17

## 2022-10-17 RX ORDER — ROSUVASTATIN CALCIUM 20 MG/1
20 TABLET, COATED ORAL EVERY EVENING
Qty: 90 TABLET | Refills: 1 | Status: SHIPPED | OUTPATIENT
Start: 2022-10-17

## 2022-10-17 RX ORDER — DICLOFENAC SODIUM 75 MG/1
TABLET, DELAYED RELEASE ORAL
Qty: 90 TABLET | Refills: 1 | Status: SHIPPED | OUTPATIENT
Start: 2022-10-17

## 2022-10-17 RX ORDER — AMLODIPINE BESYLATE 5 MG/1
5 TABLET ORAL DAILY
Qty: 90 TABLET | Refills: 1 | Status: SHIPPED | OUTPATIENT
Start: 2022-10-17

## 2022-10-17 RX ORDER — HYDROXYZINE HYDROCHLORIDE 25 MG/1
25 TABLET, FILM COATED ORAL NIGHTLY
Qty: 90 TABLET | Refills: 1 | Status: SHIPPED | OUTPATIENT
Start: 2022-10-17

## 2022-10-20 ENCOUNTER — TELEPHONE (OUTPATIENT)
Dept: FAMILY MEDICINE CLINIC | Facility: CLINIC | Age: 77
End: 2022-10-20

## 2022-10-20 NOTE — TELEPHONE ENCOUNTER
Plan requesting PA on hydroxyzine. Sent to amy. steve Fine (Key: [id number])    Your information has been sent to [facility].

## 2022-10-20 NOTE — TELEPHONE ENCOUNTER
[facility] (Key: [id number])    This request has received a Favorable outcome. Please note any additional information provided by [facility] at the bottom of this request.    pproved. This drug has been approved under the Member's Medicare Part D benefit. Approved quantity: 90 units per 90 day(s). You may fill up to a 90 day supply except for those on Specialty Tier 5, which can be filled up to a 30 day supply. Please call the pharmacy to process the prescription claim.

## 2022-11-01 ENCOUNTER — LAB ENCOUNTER (OUTPATIENT)
Dept: LAB | Age: 77
End: 2022-11-01
Attending: FAMILY MEDICINE
Payer: MEDICAID

## 2022-11-01 DIAGNOSIS — E11.9 TYPE 2 DIABETES MELLITUS WITHOUT COMPLICATION, WITHOUT LONG-TERM CURRENT USE OF INSULIN (HCC): ICD-10-CM

## 2022-11-01 DIAGNOSIS — E78.2 MIXED HYPERLIPIDEMIA: ICD-10-CM

## 2022-11-01 LAB
ALBUMIN SERPL-MCNC: 3.8 G/DL (ref 3.4–5)
ALBUMIN/GLOB SERPL: 1.1 {RATIO} (ref 1–2)
ALP LIVER SERPL-CCNC: 86 U/L
ALT SERPL-CCNC: 20 U/L
ANION GAP SERPL CALC-SCNC: 4 MMOL/L (ref 0–18)
AST SERPL-CCNC: 19 U/L (ref 15–37)
BASOPHILS # BLD AUTO: 0.06 X10(3) UL (ref 0–0.2)
BASOPHILS NFR BLD AUTO: 0.8 %
BILIRUB SERPL-MCNC: 0.9 MG/DL (ref 0.1–2)
BUN BLD-MCNC: 17 MG/DL (ref 7–18)
CALCIUM BLD-MCNC: 8.9 MG/DL (ref 8.5–10.1)
CHLORIDE SERPL-SCNC: 103 MMOL/L (ref 98–112)
CHOLEST SERPL-MCNC: 137 MG/DL (ref ?–200)
CO2 SERPL-SCNC: 28 MMOL/L (ref 21–32)
CREAT BLD-MCNC: 1.17 MG/DL
EOSINOPHIL # BLD AUTO: 0.2 X10(3) UL (ref 0–0.7)
EOSINOPHIL NFR BLD AUTO: 2.7 %
ERYTHROCYTE [DISTWIDTH] IN BLOOD BY AUTOMATED COUNT: 15.2 %
EST. AVERAGE GLUCOSE BLD GHB EST-MCNC: 151 MG/DL (ref 68–126)
FASTING PATIENT LIPID ANSWER: YES
FASTING STATUS PATIENT QL REPORTED: YES
GFR SERPLBLD BASED ON 1.73 SQ M-ARVRAT: 64 ML/MIN/1.73M2 (ref 60–?)
GLOBULIN PLAS-MCNC: 3.6 G/DL (ref 2.8–4.4)
GLUCOSE BLD-MCNC: 137 MG/DL (ref 70–99)
HBA1C MFR BLD: 6.9 % (ref ?–5.7)
HCT VFR BLD AUTO: 46 %
HDLC SERPL-MCNC: 30 MG/DL (ref 40–59)
HGB BLD-MCNC: 14.3 G/DL
IMM GRANULOCYTES # BLD AUTO: 0.03 X10(3) UL (ref 0–1)
IMM GRANULOCYTES NFR BLD: 0.4 %
LDLC SERPL CALC-MCNC: 77 MG/DL (ref ?–100)
LYMPHOCYTES # BLD AUTO: 1.73 X10(3) UL (ref 1–4)
LYMPHOCYTES NFR BLD AUTO: 23.2 %
MCH RBC QN AUTO: 24.4 PG (ref 26–34)
MCHC RBC AUTO-ENTMCNC: 31.1 G/DL (ref 31–37)
MCV RBC AUTO: 78.6 FL
MONOCYTES # BLD AUTO: 0.77 X10(3) UL (ref 0.1–1)
MONOCYTES NFR BLD AUTO: 10.3 %
NEUTROPHILS # BLD AUTO: 4.68 X10 (3) UL (ref 1.5–7.7)
NEUTROPHILS # BLD AUTO: 4.68 X10(3) UL (ref 1.5–7.7)
NEUTROPHILS NFR BLD AUTO: 62.6 %
NONHDLC SERPL-MCNC: 107 MG/DL (ref ?–130)
OSMOLALITY SERPL CALC.SUM OF ELEC: 284 MOSM/KG (ref 275–295)
PLATELET # BLD AUTO: 333 10(3)UL (ref 150–450)
POTASSIUM SERPL-SCNC: 4.1 MMOL/L (ref 3.5–5.1)
PROT SERPL-MCNC: 7.4 G/DL (ref 6.4–8.2)
RBC # BLD AUTO: 5.85 X10(6)UL
SODIUM SERPL-SCNC: 135 MMOL/L (ref 136–145)
TRIGL SERPL-MCNC: 174 MG/DL (ref 30–149)
VLDLC SERPL CALC-MCNC: 27 MG/DL (ref 0–30)
WBC # BLD AUTO: 7.5 X10(3) UL (ref 4–11)

## 2022-11-01 PROCEDURE — 36415 COLL VENOUS BLD VENIPUNCTURE: CPT

## 2022-11-01 PROCEDURE — 80061 LIPID PANEL: CPT

## 2022-11-01 PROCEDURE — 83036 HEMOGLOBIN GLYCOSYLATED A1C: CPT

## 2022-11-01 PROCEDURE — 85025 COMPLETE CBC W/AUTO DIFF WBC: CPT

## 2022-11-01 PROCEDURE — 80053 COMPREHEN METABOLIC PANEL: CPT

## 2022-11-13 ENCOUNTER — PATIENT MESSAGE (OUTPATIENT)
Dept: FAMILY MEDICINE CLINIC | Facility: CLINIC | Age: 77
End: 2022-11-13

## 2022-11-14 NOTE — TELEPHONE ENCOUNTER
From: [address]  To: Terese Vaughn MD  Sent: 11/13/2022 6:19 PM CST  Subject: Question regarding COMP METABOLIC PANEL (14)    Hi Dr. Nat Barber,    Thanks for your follow up. Can I get pdf copy of this blood work reports to my email [email or web address]. my father wants to keep a copy with him. Claudine Ozuna    Thanks,   [facility] [place]

## 2023-01-23 ENCOUNTER — PATIENT OUTREACH (OUTPATIENT)
Dept: FAMILY MEDICINE CLINIC | Facility: CLINIC | Age: 78
End: 2023-01-23

## 2023-03-13 LAB
AMB EXT CHOLESTEROL, TOTAL: 128 MG/DL
AMB EXT CMP ALT: 20 U/L
AMB EXT GLUCOSE: 120 MG/DL
AMB EXT HDL CHOLESTEROL: 29 MG/DL
AMB EXT HGBA1C: 6.3 %
AMB EXT LDL CHOLESTEROL, DIRECT: 83 MG/DL
AMB EXT NON HDL CHOL: 99 MG/DL
AMB EXT TRIGLYCERIDES: 129 MG/DL
AMB EXT VLDL: 26 MG/DL
VITAMIN D, 25-OH, TOTAL: 38.04

## 2023-05-02 RX ORDER — ROSUVASTATIN CALCIUM 20 MG/1
TABLET, COATED ORAL
Qty: 90 TABLET | Refills: 1 | Status: SHIPPED | OUTPATIENT
Start: 2023-05-02

## 2023-05-09 RX ORDER — AMLODIPINE BESYLATE 5 MG/1
TABLET ORAL
Qty: 90 TABLET | Refills: 1 | Status: SHIPPED | OUTPATIENT
Start: 2023-05-09

## 2023-05-09 RX ORDER — AMITRIPTYLINE HYDROCHLORIDE 25 MG/1
TABLET, FILM COATED ORAL
Qty: 90 TABLET | Refills: 1 | Status: SHIPPED | OUTPATIENT
Start: 2023-05-09

## 2023-06-05 ENCOUNTER — LAB ENCOUNTER (OUTPATIENT)
Dept: LAB | Age: 78
End: 2023-06-05
Attending: STUDENT IN AN ORGANIZED HEALTH CARE EDUCATION/TRAINING PROGRAM
Payer: MEDICARE

## 2023-06-05 DIAGNOSIS — R10.13 DYSPEPSIA: ICD-10-CM

## 2023-06-05 LAB
ALBUMIN SERPL-MCNC: 3.6 G/DL (ref 3.4–5)
ALBUMIN/GLOB SERPL: 1 {RATIO} (ref 1–2)
ALP LIVER SERPL-CCNC: 71 U/L
ALT SERPL-CCNC: 27 U/L
ANION GAP SERPL CALC-SCNC: 7 MMOL/L (ref 0–18)
AST SERPL-CCNC: 20 U/L (ref 15–37)
BASOPHILS # BLD AUTO: 0.07 X10(3) UL (ref 0–0.2)
BASOPHILS NFR BLD AUTO: 1 %
BILIRUB SERPL-MCNC: 0.7 MG/DL (ref 0.1–2)
BUN BLD-MCNC: 13 MG/DL (ref 7–18)
CALCIUM BLD-MCNC: 9 MG/DL (ref 8.5–10.1)
CHLORIDE SERPL-SCNC: 103 MMOL/L (ref 98–112)
CO2 SERPL-SCNC: 26 MMOL/L (ref 21–32)
CREAT BLD-MCNC: 0.97 MG/DL
EOSINOPHIL # BLD AUTO: 0.42 X10(3) UL (ref 0–0.7)
EOSINOPHIL NFR BLD AUTO: 5.8 %
ERYTHROCYTE [DISTWIDTH] IN BLOOD BY AUTOMATED COUNT: 15.8 %
FASTING STATUS PATIENT QL REPORTED: YES
GFR SERPLBLD BASED ON 1.73 SQ M-ARVRAT: 79 ML/MIN/1.73M2 (ref 60–?)
GLOBULIN PLAS-MCNC: 3.5 G/DL (ref 2.8–4.4)
GLUCOSE BLD-MCNC: 173 MG/DL (ref 70–99)
HCT VFR BLD AUTO: 42.8 %
HGB BLD-MCNC: 13.7 G/DL
IMM GRANULOCYTES # BLD AUTO: 0.03 X10(3) UL (ref 0–1)
IMM GRANULOCYTES NFR BLD: 0.4 %
INR BLD: 1.03 (ref 0.85–1.16)
LIPASE SERPL-CCNC: 65 U/L (ref 13–75)
LYMPHOCYTES # BLD AUTO: 1.72 X10(3) UL (ref 1–4)
LYMPHOCYTES NFR BLD AUTO: 23.9 %
MCH RBC QN AUTO: 25.8 PG (ref 26–34)
MCHC RBC AUTO-ENTMCNC: 32 G/DL (ref 31–37)
MCV RBC AUTO: 80.8 FL
MONOCYTES # BLD AUTO: 0.73 X10(3) UL (ref 0.1–1)
MONOCYTES NFR BLD AUTO: 10.1 %
NEUTROPHILS # BLD AUTO: 4.23 X10 (3) UL (ref 1.5–7.7)
NEUTROPHILS # BLD AUTO: 4.23 X10(3) UL (ref 1.5–7.7)
NEUTROPHILS NFR BLD AUTO: 58.8 %
OSMOLALITY SERPL CALC.SUM OF ELEC: 286 MOSM/KG (ref 275–295)
PLATELET # BLD AUTO: 314 10(3)UL (ref 150–450)
POTASSIUM SERPL-SCNC: 3.9 MMOL/L (ref 3.5–5.1)
PROT SERPL-MCNC: 7.1 G/DL (ref 6.4–8.2)
PROTHROMBIN TIME: 13.5 SECONDS (ref 11.6–14.8)
RBC # BLD AUTO: 5.3 X10(6)UL
SODIUM SERPL-SCNC: 136 MMOL/L (ref 136–145)
WBC # BLD AUTO: 7.2 X10(3) UL (ref 4–11)

## 2023-06-05 PROCEDURE — 85610 PROTHROMBIN TIME: CPT

## 2023-06-05 PROCEDURE — 83690 ASSAY OF LIPASE: CPT

## 2023-06-05 PROCEDURE — 80053 COMPREHEN METABOLIC PANEL: CPT

## 2023-06-05 PROCEDURE — 85025 COMPLETE CBC W/AUTO DIFF WBC: CPT

## 2023-06-05 PROCEDURE — 36415 COLL VENOUS BLD VENIPUNCTURE: CPT

## 2023-06-08 NOTE — PROGRESS NOTES
Jose A Lopez,    Your recent blood work shows normal liver enzymes, and a normal red blood cell count. Your blood sugar is high, I recommend you follow-up with your primary care physician for further evaluation of this. Please let me know if you have any questions or concerns.      Sincerely,  Bertha Vyas MD

## 2023-06-14 ENCOUNTER — OFFICE VISIT (OUTPATIENT)
Dept: FAMILY MEDICINE CLINIC | Facility: CLINIC | Age: 78
End: 2023-06-14
Payer: MEDICARE

## 2023-06-14 VITALS
SYSTOLIC BLOOD PRESSURE: 118 MMHG | WEIGHT: 148 LBS | HEIGHT: 66.26 IN | BODY MASS INDEX: 23.78 KG/M2 | DIASTOLIC BLOOD PRESSURE: 64 MMHG | OXYGEN SATURATION: 96 % | TEMPERATURE: 98 F | HEART RATE: 88 BPM

## 2023-06-14 DIAGNOSIS — L29.9 ITCHING: ICD-10-CM

## 2023-06-14 DIAGNOSIS — G89.29 CHRONIC PAIN OF BOTH SHOULDERS: ICD-10-CM

## 2023-06-14 DIAGNOSIS — M25.511 CHRONIC PAIN OF BOTH SHOULDERS: ICD-10-CM

## 2023-06-14 DIAGNOSIS — E78.2 MIXED HYPERLIPIDEMIA: ICD-10-CM

## 2023-06-14 DIAGNOSIS — K21.9 GASTROESOPHAGEAL REFLUX DISEASE WITHOUT ESOPHAGITIS: ICD-10-CM

## 2023-06-14 DIAGNOSIS — M25.512 CHRONIC PAIN OF BOTH SHOULDERS: ICD-10-CM

## 2023-06-14 DIAGNOSIS — E11.9 TYPE 2 DIABETES MELLITUS WITHOUT COMPLICATION, WITHOUT LONG-TERM CURRENT USE OF INSULIN (HCC): Primary | ICD-10-CM

## 2023-06-14 PROCEDURE — 99214 OFFICE O/P EST MOD 30 MIN: CPT | Performed by: FAMILY MEDICINE

## 2023-06-14 RX ORDER — AMITRIPTYLINE HYDROCHLORIDE 25 MG/1
25 TABLET, FILM COATED ORAL NIGHTLY
Qty: 90 TABLET | Refills: 1 | Status: SHIPPED | OUTPATIENT
Start: 2023-06-14

## 2023-06-14 RX ORDER — BLOOD-GLUCOSE METER
1 KIT MISCELLANEOUS DAILY
Qty: 1 KIT | Refills: 0 | Status: SHIPPED | OUTPATIENT
Start: 2023-06-14 | End: 2024-06-13

## 2023-06-14 RX ORDER — BLOOD-GLUCOSE METER
KIT MISCELLANEOUS
Qty: 100 STRIP | Refills: 1 | Status: SHIPPED | OUTPATIENT
Start: 2023-06-14

## 2023-06-14 RX ORDER — AMLODIPINE BESYLATE 5 MG/1
5 TABLET ORAL DAILY
Qty: 90 TABLET | Refills: 1 | Status: SHIPPED | OUTPATIENT
Start: 2023-06-14

## 2023-06-14 RX ORDER — LANCETS 28 GAUGE
1 EACH MISCELLANEOUS DAILY
Qty: 100 EACH | Refills: 0 | Status: SHIPPED | OUTPATIENT
Start: 2023-06-14 | End: 2024-06-13

## 2023-06-14 RX ORDER — ROSUVASTATIN CALCIUM 20 MG/1
20 TABLET, COATED ORAL EVERY EVENING
Qty: 90 TABLET | Refills: 1 | Status: SHIPPED | OUTPATIENT
Start: 2023-06-14

## 2023-06-14 RX ORDER — HYDROXYZINE HYDROCHLORIDE 25 MG/1
25 TABLET, FILM COATED ORAL NIGHTLY
Qty: 90 TABLET | Refills: 1 | Status: SHIPPED | OUTPATIENT
Start: 2023-06-14

## 2023-06-14 RX ORDER — DICLOFENAC SODIUM 75 MG/1
TABLET, DELAYED RELEASE ORAL
Qty: 90 TABLET | Refills: 1 | Status: SHIPPED | OUTPATIENT
Start: 2023-06-14

## 2023-06-14 RX ORDER — PANTOPRAZOLE SODIUM 40 MG/1
TABLET, DELAYED RELEASE ORAL
Qty: 90 TABLET | Refills: 2 | Status: SHIPPED | OUTPATIENT
Start: 2023-06-14

## 2023-06-14 NOTE — PATIENT INSTRUCTIONS
Start generic claritin (loratadine) 10mg daily - can search online - Aye Boyd or susana usually have cheapest costs    Go to lab for fasting bloodwork    Followup with Dr. Bobo Sales as planned    Call to schedule appt with Dr. Bert Martinez when able to    Moisturize skin after each shower    Followup next month as planned for wellness

## 2023-06-15 ENCOUNTER — TELEPHONE (OUTPATIENT)
Dept: FAMILY MEDICINE CLINIC | Facility: CLINIC | Age: 78
End: 2023-06-15

## 2023-06-15 NOTE — TELEPHONE ENCOUNTER
Called pharmacy and asked them to resend the form to be filled out. Unable to contact Medicare for this.

## 2023-06-15 NOTE — TELEPHONE ENCOUNTER
Pt's son called and is requesting to speak to someone about pts glucose monitor and strips.  Stated pharmacy informed him that Dr has to fill out a form to get approval.

## 2023-06-15 NOTE — TELEPHONE ENCOUNTER
Kimberly called and stated that dr needs to call Medicare and inform them that pt is now Diabetic. Stated previously when dr filled out CMN form it stated that pt was not diabetic but now that he is Dr has to let Medicare know in order to get glucose machine.

## 2023-06-21 ENCOUNTER — LAB ENCOUNTER (OUTPATIENT)
Dept: LAB | Age: 78
End: 2023-06-21
Attending: FAMILY MEDICINE
Payer: MEDICARE

## 2023-06-21 DIAGNOSIS — E11.9 TYPE 2 DIABETES MELLITUS WITHOUT COMPLICATION, WITHOUT LONG-TERM CURRENT USE OF INSULIN (HCC): ICD-10-CM

## 2023-06-21 DIAGNOSIS — E78.2 MIXED HYPERLIPIDEMIA: ICD-10-CM

## 2023-06-21 LAB
CHOLEST SERPL-MCNC: 195 MG/DL (ref ?–200)
CK SERPL-CCNC: 123 U/L
CREAT UR-SCNC: 40.2 MG/DL
EST. AVERAGE GLUCOSE BLD GHB EST-MCNC: 154 MG/DL (ref 68–126)
FASTING PATIENT LIPID ANSWER: YES
HBA1C MFR BLD: 7 % (ref ?–5.7)
HDLC SERPL-MCNC: 30 MG/DL (ref 40–59)
LDLC SERPL CALC-MCNC: 115 MG/DL (ref ?–100)
MICROALBUMIN UR-MCNC: 1.6 MG/DL
MICROALBUMIN/CREAT 24H UR-RTO: 39.8 UG/MG (ref ?–30)
NONHDLC SERPL-MCNC: 165 MG/DL (ref ?–130)
TRIGL SERPL-MCNC: 288 MG/DL (ref 30–149)
TSI SER-ACNC: 3.13 MIU/ML (ref 0.36–3.74)
VLDLC SERPL CALC-MCNC: 51 MG/DL (ref 0–30)

## 2023-06-21 PROCEDURE — 82550 ASSAY OF CK (CPK): CPT

## 2023-06-21 PROCEDURE — 82043 UR ALBUMIN QUANTITATIVE: CPT

## 2023-06-21 PROCEDURE — 83036 HEMOGLOBIN GLYCOSYLATED A1C: CPT

## 2023-06-21 PROCEDURE — 36415 COLL VENOUS BLD VENIPUNCTURE: CPT

## 2023-06-21 PROCEDURE — 82570 ASSAY OF URINE CREATININE: CPT

## 2023-06-21 PROCEDURE — 80061 LIPID PANEL: CPT

## 2023-06-21 PROCEDURE — 84443 ASSAY THYROID STIM HORMONE: CPT

## 2023-06-27 ENCOUNTER — TELEPHONE (OUTPATIENT)
Dept: FAMILY MEDICINE CLINIC | Facility: CLINIC | Age: 78
End: 2023-06-27

## 2023-07-10 RX ORDER — POLYETHYLENE GLYCOL 3350 17 G/17G
17 POWDER, FOR SOLUTION ORAL DAILY
COMMUNITY

## 2023-07-14 ENCOUNTER — TELEPHONE (OUTPATIENT)
Dept: FAMILY MEDICINE CLINIC | Facility: CLINIC | Age: 78
End: 2023-07-14

## 2023-07-14 DIAGNOSIS — H91.93 HEARING PROBLEM OF BOTH EARS: Primary | ICD-10-CM

## 2023-07-14 NOTE — TELEPHONE ENCOUNTER
Pt. Has appt. With Dr. Ramone Pena on 7/25/23 patients son is asking if he is in need for referral for the Audio Appt. Please advise.       Future Appointments   Date Time Provider Stefanie Clarke   7/25/2023  2:45 PM EMG MAGALI [facility]   7/25/2023  3:00 PM Tristen Goldberg MD [facility]

## 2023-07-17 ENCOUNTER — OFFICE VISIT (OUTPATIENT)
Dept: FAMILY MEDICINE CLINIC | Facility: CLINIC | Age: 78
End: 2023-07-17
Payer: MEDICARE

## 2023-07-17 VITALS
BODY MASS INDEX: 24.11 KG/M2 | DIASTOLIC BLOOD PRESSURE: 70 MMHG | HEART RATE: 86 BPM | HEIGHT: 66 IN | TEMPERATURE: 98 F | WEIGHT: 150 LBS | SYSTOLIC BLOOD PRESSURE: 120 MMHG

## 2023-07-17 DIAGNOSIS — E55.9 VITAMIN D DEFICIENCY: ICD-10-CM

## 2023-07-17 DIAGNOSIS — M25.512 CHRONIC PAIN OF BOTH SHOULDERS: ICD-10-CM

## 2023-07-17 DIAGNOSIS — E78.2 MIXED HYPERLIPIDEMIA: ICD-10-CM

## 2023-07-17 DIAGNOSIS — Z00.00 ENCOUNTER FOR ANNUAL HEALTH EXAMINATION: Primary | ICD-10-CM

## 2023-07-17 DIAGNOSIS — K21.9 GASTROESOPHAGEAL REFLUX DISEASE WITHOUT ESOPHAGITIS: ICD-10-CM

## 2023-07-17 DIAGNOSIS — R07.89 ATYPICAL CHEST PAIN: ICD-10-CM

## 2023-07-17 DIAGNOSIS — M25.511 CHRONIC PAIN OF BOTH SHOULDERS: ICD-10-CM

## 2023-07-17 DIAGNOSIS — G89.29 CHRONIC PAIN OF BOTH SHOULDERS: ICD-10-CM

## 2023-07-17 DIAGNOSIS — N35.919 STRICTURE OF MALE URETHRA, UNSPECIFIED STRICTURE TYPE: ICD-10-CM

## 2023-07-17 DIAGNOSIS — E11.9 TYPE 2 DIABETES MELLITUS WITHOUT COMPLICATION, WITHOUT LONG-TERM CURRENT USE OF INSULIN (HCC): ICD-10-CM

## 2023-07-17 RX ORDER — TRIAMCINOLONE ACETONIDE 1 MG/G
CREAM TOPICAL
Qty: 454 G | Refills: 1 | Status: SHIPPED | OUTPATIENT
Start: 2023-07-17

## 2023-07-17 RX ORDER — ROSUVASTATIN CALCIUM 10 MG/1
10 TABLET, COATED ORAL NIGHTLY
Qty: 90 TABLET | Refills: 1 | Status: SHIPPED | OUTPATIENT
Start: 2023-07-17

## 2023-07-17 NOTE — PATIENT INSTRUCTIONS
Amy Fine's SCREENING SCHEDULE   Tests on this list are recommended by your physician but may not be covered, or covered at this frequency, by your insurer. Please check with your insurance carrier before scheduling to verify coverage. PREVENTATIVE SERVICES FREQUENCY &  COVERAGE DETAILS LAST COMPLETION DATE   Diabetes Screening    Fasting Blood Sugar / Glucose    One screening every 12 months if never tested or if previously tested but not diagnosed with pre-diabetes   One screening every 6 months if diagnosed with pre-diabetes Lab Results   Component Value Date     (H) 06/05/2023        Cardiovascular Disease Screening    Lipid Panel  Cholesterol  Lipoprotein (HDL)  Triglycerides Covered every 5 years for all Medicare beneficiaries without apparent signs or symptoms of cardiovascular disease Lab Results   Component Value Date    CHOLEST 195 06/21/2023    HDL 30 (L) 06/21/2023     (H) 06/21/2023    LDLD 83 03/13/2023    TRIG 288 (H) 06/21/2023         Electrocardiogram (EKG)   Covered if needed at Welcome to Medicare, and non-screening if indicated for medical reasons 08/18/2021      Ultrasound Screening for Abdominal Aortic Aneurysm (AAA) Covered once in a lifetime for one of the following risk factors    Men who are 73-68 years old and have ever smoked    Anyone with a family history -     Colorectal Cancer Screening  Covered for ages 52-80; only need ONE of the following:    Colonoscopy   Covered every 10 years    Covered every 2 years if patient is at high risk or previous colonoscopy was abnormal -    No recommendations at this time    Flexible Sigmoidoscopy   Covered every 4 years -    Fecal Occult Blood Test Covered annually -   Prostate Cancer Screening    Prostate-Specific Antigen (PSA) Annually Lab Results   Component Value Date    PSA 0.193 10/29/2016     There are no preventive care reminders to display for this patient.    Immunizations    Influenza Covered once per flu season  Please get every year -  No recommendations at this time    Pneumococcal Each vaccine ([id number] & [id number]) covered once after 65 Prevnar 13: -    [id number]: -     No recommendations at this time    Hepatitis B One screening covered for patients with certain risk factors   -  No recommendations at this time    Tetanus Toxoid Not covered by Medicare Part B unless medically necessary (cut with metal); may be covered with your pharmacy prescription benefits -    Tetanus, Diptheria and Pertusis TD and TDaP Not covered by Medicare Part B -  No recommendations at this time    Zoster Not covered by Medicare Part B; may be covered with your pharmacy  prescription benefits -  No recommendations at this time     Diabetes      Hemoglobin A1C Annually; if last result is elevated, may be asked to retest more frequently.     Medicare covers every 3 months Lab Results   Component Value Date     (H) 06/21/2023    A1C 7.0 (H) 06/21/2023       No recommendations at this time    Creat/alb ratio Annually Lab Results   Component Value Date    MICROALBCREA 39.8 (H) 06/21/2023       LDL Annually Lab Results   Component Value Date     (H) 06/21/2023       Dilated Eye Exam Annually 10/21/2021

## 2023-07-18 ENCOUNTER — HOSPITAL ENCOUNTER (OUTPATIENT)
Facility: HOSPITAL | Age: 78
Setting detail: HOSPITAL OUTPATIENT SURGERY
Discharge: HOME | End: 2023-07-18
Attending: STUDENT IN AN ORGANIZED HEALTH CARE EDUCATION/TRAINING PROGRAM | Admitting: STUDENT IN AN ORGANIZED HEALTH CARE EDUCATION/TRAINING PROGRAM
Payer: MEDICARE

## 2023-07-18 ENCOUNTER — ANESTHESIA (OUTPATIENT)
Dept: ENDOSCOPY | Facility: HOSPITAL | Age: 78
End: 2023-07-18
Payer: MEDICARE

## 2023-07-18 ENCOUNTER — ANESTHESIA EVENT (OUTPATIENT)
Dept: ENDOSCOPY | Facility: HOSPITAL | Age: 78
End: 2023-07-18
Payer: MEDICARE

## 2023-07-18 VITALS
RESPIRATION RATE: 19 BRPM | WEIGHT: 147 LBS | DIASTOLIC BLOOD PRESSURE: 75 MMHG | OXYGEN SATURATION: 97 % | HEIGHT: 66 IN | BODY MASS INDEX: 23.63 KG/M2 | HEART RATE: 74 BPM | SYSTOLIC BLOOD PRESSURE: 131 MMHG | TEMPERATURE: 98 F

## 2023-07-18 DIAGNOSIS — Z12.11 COLON CANCER SCREENING: ICD-10-CM

## 2023-07-18 DIAGNOSIS — R10.13 DYSPEPSIA: ICD-10-CM

## 2023-07-18 DIAGNOSIS — R06.02 SHORTNESS OF BREATH: ICD-10-CM

## 2023-07-18 DIAGNOSIS — R10.13 EPIGASTRIC PAIN: ICD-10-CM

## 2023-07-18 LAB
GLUCOSE BLD-MCNC: 111 MG/DL (ref 70–99)
GLUCOSE BLD-MCNC: 111 MG/DL (ref 70–99)

## 2023-07-18 PROCEDURE — 0DBL8ZX EXCISION OF TRANSVERSE COLON, VIA NATURAL OR ARTIFICIAL OPENING ENDOSCOPIC, DIAGNOSTIC: ICD-10-PCS | Performed by: STUDENT IN AN ORGANIZED HEALTH CARE EDUCATION/TRAINING PROGRAM

## 2023-07-18 PROCEDURE — 82962 GLUCOSE BLOOD TEST: CPT

## 2023-07-18 PROCEDURE — 0DJ08ZZ INSPECTION OF UPPER INTESTINAL TRACT, VIA NATURAL OR ARTIFICIAL OPENING ENDOSCOPIC: ICD-10-PCS | Performed by: STUDENT IN AN ORGANIZED HEALTH CARE EDUCATION/TRAINING PROGRAM

## 2023-07-18 PROCEDURE — 88305 TISSUE EXAM BY PATHOLOGIST: CPT | Performed by: STUDENT IN AN ORGANIZED HEALTH CARE EDUCATION/TRAINING PROGRAM

## 2023-07-18 RX ORDER — DEXTROSE MONOHYDRATE 25 G/50ML
50 INJECTION, SOLUTION INTRAVENOUS
Status: DISCONTINUED | OUTPATIENT
Start: 2023-07-18 | End: 2023-07-18

## 2023-07-18 RX ORDER — NICOTINE POLACRILEX 4 MG
15 LOZENGE BUCCAL
Status: DISCONTINUED | OUTPATIENT
Start: 2023-07-18 | End: 2023-07-18

## 2023-07-18 RX ORDER — LIDOCAINE HYDROCHLORIDE 20 MG/ML
INJECTION, SOLUTION EPIDURAL; INFILTRATION; INTRACAUDAL; PERINEURAL AS NEEDED
Status: DISCONTINUED | OUTPATIENT
Start: 2023-07-18 | End: 2023-07-18 | Stop reason: SURG

## 2023-07-18 RX ORDER — SODIUM CHLORIDE, SODIUM LACTATE, POTASSIUM CHLORIDE, CALCIUM CHLORIDE 600; 310; 30; 20 MG/100ML; MG/100ML; MG/100ML; MG/100ML
INJECTION, SOLUTION INTRAVENOUS CONTINUOUS
Status: DISCONTINUED | OUTPATIENT
Start: 2023-07-18 | End: 2023-07-18

## 2023-07-18 RX ORDER — NICOTINE POLACRILEX 4 MG
30 LOZENGE BUCCAL
Status: DISCONTINUED | OUTPATIENT
Start: 2023-07-18 | End: 2023-07-18

## 2023-07-18 RX ADMIN — LIDOCAINE HYDROCHLORIDE 100 MG: 20 INJECTION, SOLUTION EPIDURAL; INFILTRATION; INTRACAUDAL; PERINEURAL at 12:24:00

## 2023-07-18 NOTE — DISCHARGE INSTRUCTIONS

## 2023-07-18 NOTE — OPERATIVE REPORT
EGD Operative Report  Patient Name: [facility]  YOB: 1945  MRN: [id number]  Procedure: Esophagogastroduodenoscopy (EGD)    Pre-operative Diagnosis & Indication: dysphagia, heartburn   Post-operative Diagnosis: GE junction nodule   Attending Endoscopist: Raad Dong M.D. Informed Consent: The planned procedure(s), the explanation of the procedure, its expected benefits, the potential complications and risks and possible alternatives and their benefits and risks were discussed with the patient or the patient's surrogate. The discussion of risks, not limited to but including bleeding, infection, perforation, adverse effects from anesthesia, need for emergency surgery/prolonged hospitalization,  cardiac arrhythmias,  and aspiration were discussed with patient. Pt and/or surrogate understood the proposed procedure(s), its risks, benefits and alternatives and wish to proceed with procedure(s). All questions answered in full. After all questions were answered to their satisfaction, a signed, informed, and witnessed consent was obtained. Physical Exam: Heart: regular rate and rhythm. No rubs, murmurs, or gallops. Lungs: Clear to auscultation bilaterally. Abdomen: Soft, non-tender, non distended. No rebound tenderness, no guarding. A TIME OUT WAS COMPLETED prior to the procedure to confirm the patient, procedure(s) and complete endoscopy safety procedure. Sedation: Monitored Anesthesia Care; ASA class per anesthesiology team   Monitoring: Pulsoximetry, pulse, respirations, and blood pressure , vitals were monitored throughout the entire procedure under monitored anesthesia care. Procedure: The patient was then brought to the endoscopy suite where his/her pulse, pulse oximetry and blood pressure were monitored. The patient was placed in the left lateral decubitus position and deep sedation was administered.  Once adequate sedation was achieved, a bite block was placed and a lubricated tip of an Olympus video upper endoscope was inserted through the oropharynx and gently manipulated through the esophagus into the stomach and the second portion of the duodenum. Upon withdrawal of the endoscope, careful visualization of the mucosa was performed. The endoscope was then withdrawn into the gastric antrum and placed in a retroflexed position. The endoscope was then righted, and air was suctioned from the stomach. The endoscope was then withdrawn from the patient, with careful visual inspection of the mucosa. The patient left the procedure room in stable condition for recovery. Findings and endotherapy as listed below  Toleration: Patient tolerated procedure well   Complications: No immediate complications   Technical Difficulty:  The procedure was not technically difficult   Estimated Blood Loss: Minimal, less than 5mL of estimated blood loss. Findings and Therapeutics:  Esophagus: The mucosa was normal.   There were no strictures or stenosis. GEJ junction traversed with endoscope without resistance. The Z-line was irregular, appreciated at 40 cm from the incisors. Stomach:    Just distal 2cm from the GE junction, in the stomach, irregular, 1cm, polypoid/vascular vs. Inflammatory nodule. Given appearance, concern for deeper vascular structure, submucosal component - unclear if this extends to GE junction causing symptoms. The apex appeared complex, with ulceration - although with irrigation this could not be easily washed off. Given above, concern for bleeding, and irregular appearance,  resection or biopsies were not attempted. The gastric body, antrum, fundus, cardia, and angularis were normal. No ulcers, erosions or masses visualized. Endoscope was placed in a retroflexion view in the stomach. There was no evidence of a hiatal hernia.    Duodenum:   The entire examined duodenum was normal.    Recommendations:  Post EGD precautions, watch for bleeding, infection, perforation, adverse drug reactions Continue PPI once daily   Recommend EUS for evaluation of polypoid lesion, with possible endotherapy EMR/or resection. Avoid non-aspirin NSAIDs; H pylori stool Ag.    Proceed with colonoscopy     Zora Raza MD  7/18/2023  12:30 PM

## 2023-07-18 NOTE — OPERATIVE REPORT
Colonoscopy Operative Report  Patient Name: Sophia Jefferson  YOB: 1945  MRN: [id number]  Procedure: Colonoscopy with polypectomy   Pre-operative Diagnosis & Indication: colon cancer screening    Post-operative Diagnosis: colon polyp x 2 s/p polypectomy ; diverticulosis; hemorrhoids   Attending Endoscopist: [place] Diamond M.D. Informed Consent: The planned procedure(s), the explanation of the procedure, its expected benefits, the potential complications and risks and possible alternatives and their benefits and risks were discussed with the patient or the patient's surrogate. The discussion of risks, not limited to but including bleeding, infection, perforation, adverse effects from anesthesia, need for emergency surgery, medication effects, cardiac arrhythmias, missed polyps, and aspiration and death, were discussed with patient. Pt and/or surrogate understood the proposed procedure(s), its risks, benefits and alternatives and wish to proceed with procedure(s). All questions answered in full. After all questions were answered to their satisfaction, a signed, informed, and witnessed consent was obtained. Physical Exam: Heart: regular rate and rhythm. No rubs, murmurs, or gallops. Lungs: Clear to auscultation bilaterally. Abdomen: Soft, non-tender, non distended. Positive bowel sounds. No rebound tenderness, no guarding. A TIME OUT WAS COMPLETED prior to the procedure to confirm the patient, procedure and complete endoscopy safety procedure. Sedation: Monitored Anesthesia Care; ASA class per anesthesiology team   Monitoring: Pulsoximetry, pulse, respirations, and blood pressure, vitals were monitored throughout the entire procedure under monitored anesthesia care. Preparation Quality:  Abbott Bowel Prep Score: 7  [Right2/ Transverse2/ Sandro.Crigler ]   Procedure: After achieving adequate sedation, and placing the patient in the left lateral decubitus position, a digital rectal examination was performed. The lubricated tip of the  colonoscope was then introduced into the rectum and advanced to the terminal ileum. The appendiceal orifice and ileocecal valve were clearly and distinctly visualized, thus verifying the cecum. The terminal ileum was intubated. The endoscope was then carefully withdrawn from the patient with careful visualization of the colonic mucosa to the best of my ability, with bowel preparation quality as noted above. Air was suctioned to the best of my ability, during withdrawal of the endoscope. When the endoscope reached the rectum, it was placed in a retroflexed position, and the rectal bulb was thus visualized. The endoscope was righted, and air was suctioned from the colon to the best of my ability, as it was during withdrawn from the colon. The endoscope was then removed from the patient. The patient tolerated the procedure without apparent procedural complications. The patient left the procedure room in stable condition for recovery. Toleration: Patient tolerated procedure well   Complications: No immediate complications   Technical Difficulty:  The procedure was not technically difficult   Estimated Blood Loss: Minimal, less than 5mL of estimated blood loss. Withdrawal time: Withdrawal of endoscope was 14 minutes  Findings and Therapeutics:  Terminal Ileum:  The entire examined ileum was normal.   Colon:   Two sessile polyps, 3mm and 4mm in the transverse colon. Complete polypectomy with cold snare. Polyps retrieved. Hemostasis. Pathology pending. Few small diverticuli in the sigmoid colon. Non bleeding. Rectum: There were small  sized, internal and medium external hemorrhoids seen on retroflexion. Recommendations:    Post Colonoscopy/polypectomy precautions, watch for bleeding, infection, perforation, adverse drug reactions.    High fiber diet  Follow-up pathology   Recall colonoscopy in 5 years, pending pathology   Follow-up in GI clinic in 8-12 weeks     Soni Licona MD  7/18/2023  1:03 PM

## 2023-07-18 NOTE — ANESTHESIA POSTPROCEDURE EVALUATION
[address] 48 Patient Status:  Hospital Outpatient Surgery   Age/Gender 77year old male MRN [id number]   Location [address] [address] Attending Kenisha Quinonez MD   Hosp Day # 0 PCP Saurav Bryson MD       Anesthesia Post-op Note    ESOPHAGOGASTRODUODENOSCOPY (EGD), COLONOSCOPY with cold snare polypectomy    Procedure Summary       Date: 07/18/23 Room / Location: [facility] ENDOSCOPY 02 / 1404 [facility] ENDOSCOPY    Anesthesia Start: 7310 Anesthesia Stop: 9936    Procedures:       ESOPHAGOGASTRODUODENOSCOPY (EGD), COLONOSCOPY with cold snare polypectomy      COLONOSCOPY Diagnosis:       Dyspepsia      Colon cancer screening      Shortness of breath      Epigastric pain      (EGD: GE junction nodule  COLON: polyps, diverticulosis, hemorrhoids)    Surgeons: Kenisha Quinonez MD Anesthesiologist: Vero Yanes MD    Anesthesia Type: MAC ASA Status: 2            Anesthesia Type: MAC    Vitals Value Taken Time   BP 92/65 07/18/23 1258   Temp   07/18/23 1258   Pulse 68 07/18/23 1258   Resp 14 07/18/23 1258   SpO2 96 07/18/23 1258       Patient Location: Endoscopy    Anesthesia Type: MAC    Airway Patency: patent    Postop Pain Control: adequate    Mental Status: preanesthetic baseline    Nausea/Vomiting: none    Cardiopulmonary/Hydration status: stable euvolemic    Complications: no apparent anesthesia related complications    Postop vital signs: stable    Dental Exam: Unchanged from Preop    Patient to be discharged from PACU when criteria met.

## 2023-07-19 ENCOUNTER — PATIENT MESSAGE (OUTPATIENT)
Dept: FAMILY MEDICINE CLINIC | Facility: CLINIC | Age: 78
End: 2023-07-19

## 2023-07-19 DIAGNOSIS — E11.9 TYPE 2 DIABETES MELLITUS WITHOUT COMPLICATION, WITHOUT LONG-TERM CURRENT USE OF INSULIN (HCC): ICD-10-CM

## 2023-07-20 ENCOUNTER — TELEPHONE (OUTPATIENT)
Dept: FAMILY MEDICINE CLINIC | Facility: CLINIC | Age: 78
End: 2023-07-20

## 2023-07-20 NOTE — PROGRESS NOTES
Patient needs repeat colonoscopy in 5 years. Please enter recall and update health maintenance. Thank you,   Fauzia Woods    ----    [address] recently had a colonoscopy procedure completed with biopsies of the lining of your colon. The biopsies obtained from your recent colonoscopy indicate that the polyps were adenomas, which, although benign (no evidence of cancer), are considered potentially pre-cancerous and you will need long term follow-up. I am advising you to undergo a repeat colonoscopy in 5 years and at periodic intervals thereafter. We will send you a reminder card when the time of your procedure is near. Please call the office if you have any questions or concerns about these results.      Sincerely,    Fauzia Woods MD  [facility]  [phone]

## 2023-07-20 NOTE — TELEPHONE ENCOUNTER
Notified by pharmacy that diabetic supplies are not covered by Medicare if not enrolled in Part A and B. Patient has Part B only.  Will try to send PA to see if that would work.  ______________________________________________________    [facility] sells them also, much less expensive than it used to be:         [employer] [id number] Blood Glucose Monitor Kit,     100 Glucometer Strips, 100 Lancets, 1 Blood Sugar Monitor, Blood Sugar Test Kit with Control Solution, Lancing Device, No Coding, Large Display  Visit the [employer]  4.4 4.4 out of 5 stars    1,719 ratings  46 answered questions  #1 Best Seller in Blood Glucose Monitors  $32.99 ($32.99 / Count)

## 2023-07-22 RX ORDER — BLOOD-GLUCOSE METER
KIT MISCELLANEOUS
Qty: 100 STRIP | Refills: 1 | Status: SHIPPED | OUTPATIENT
Start: 2023-07-22

## 2023-07-22 RX ORDER — BLOOD-GLUCOSE METER
1 KIT MISCELLANEOUS DAILY
Qty: 1 KIT | Refills: 0 | Status: SHIPPED | OUTPATIENT
Start: 2023-07-22 | End: 2024-07-21

## 2023-07-22 RX ORDER — LANCETS 28 GAUGE
1 EACH MISCELLANEOUS DAILY
Qty: 100 EACH | Refills: 0 | Status: SHIPPED | OUTPATIENT
Start: 2023-07-22 | End: 2024-07-21

## 2023-07-22 NOTE — TELEPHONE ENCOUNTER
From: Ginette Cole  To: Pieter Evans MD  Sent: 7/19/2023 8:15 PM CDT  Subject: Blood sugar monitor and supplies    Hi Dr. Matt Hamilton to keep bothering you. Just came to [facility] to  supplies. And they inform us that your last prescription from June 14th has not been Arrived into their system. They are requesting to send those prescriptions again. They also give me a new story that current supplies that are ready right now was a mistake on their end, Where one of their staff member scaned a previous prescription to refill and was declined by insurance. I would appreciate if you please resubmit your prescriptions for blood sugars monitor and other supplies with a note of Substitute or whichever insurance approved. .    Thanks

## 2023-07-25 ENCOUNTER — OFFICE VISIT (OUTPATIENT)
Facility: LOCATION | Age: 78
End: 2023-07-25
Payer: MEDICARE

## 2023-07-25 DIAGNOSIS — H90.3 SENSORY HEARING LOSS, BILATERAL: Primary | ICD-10-CM

## 2023-07-25 DIAGNOSIS — H61.23 BILATERAL IMPACTED CERUMEN: ICD-10-CM

## 2023-07-25 DIAGNOSIS — H90.3 SENSORINEURAL HEARING LOSS (SNHL) OF BOTH EARS: Primary | ICD-10-CM

## 2023-07-25 PROCEDURE — 99204 OFFICE O/P NEW MOD 45 MIN: CPT | Performed by: OTOLARYNGOLOGY

## 2023-07-25 PROCEDURE — 92557 COMPREHENSIVE HEARING TEST: CPT | Performed by: AUDIOLOGIST

## 2023-07-25 PROCEDURE — 92567 TYMPANOMETRY: CPT | Performed by: AUDIOLOGIST

## 2023-07-25 NOTE — PROGRESS NOTES
[facility], [facility] Jaquan Temple    Report of Consultation    Date of Consult: 7/25/2023     Reason for Consultation:   Decreased hearing. History of Present Illness:   Patient is a 77year old male who is being seen for decreased hearing. He is worn hearing aids for the last 7 years and noticed slight decrease in that timeframe. Does have a history of noise exposure working in [employer] and also while in the [employer].  He feels both ears slightly plugged. Denies nausea vomiting or other constitutional complaints.     Past Medical History  Past Medical History:   Diagnosis Date    Abdominal pain     Anxiety     Arthritis     Back injury     1960s    Calculus of kidney     Constipation     Diabetes (HCC)     Fatigue     Flatulence/gas pain/belching     Frequent urination     Hearing loss     Heartburn     High blood pressure     High cholesterol     Hyperlipidemia 2000    Border level    Irregular bowel habits     Loss of appetite     Pain in joints     Rash     Shortness of breath     Sleep apnea     Uncomfortable fullness after meals     Wears glasses     Weight gain        Past Surgical History  Past Surgical History:   Procedure Laterality Date    COLONOSCOPY  2014    COLONOSCOPY N/A 7/18/2023    Procedure: COLONOSCOPY;  Surgeon: Lisa Grimaldo MD;  Location: [facility] ENDOSCOPY    EGD         Family History  Family History   Problem Relation Age of Onset    Diabetes Mother     Hypertension Mother     Heart Attack Father     Colon Cancer Sister     Hypertension Brother        Social History  Patient Guardians:  Not on file    Other Topics            Concern  Caffeine Concern        Yes    Comment:2 small cups of tea everyday  Stress Concern          No  Weight Concern          No  Special Diet            Yes    Comment:No spices or irby food  Exercise                Yes    Comment:Daily walking  Seat Belt               Yes    Social History Narrative    None on file            Current Medications:  Current Outpatient Medications   Medication Sig Dispense Refill    Glucose Blood (FREESTYLE LITE TEST) In Vitro Strip Use as directed once daily. Dx: 11.65 100 strip 1    FreeStyle Lancets Does not apply Misc 1 each by Finger stick route daily. E11.65 100 each 0    Blood Glucose Monitoring Suppl (FREESTYLE FREEDOM LITE) w/Device Does not apply Kit 1 Device by Other route daily. E11.65 1 kit 0    triamcinolone 0.1 % External Cream APPLY TO THE AFFECTED AREA TWICE DAILY FOR 1 TO 2 WEEKS 454 g 1    rosuvastatin 10 MG Oral Tab Take 1 tablet (10 mg total) by mouth nightly. 90 tablet 1    Cholecalciferol (VITAMIN D3) 1.25 MG (23310 UT) Oral Tab Take 1 tablet by mouth once a week. Ok to change formulation to whatever is available if this is not stocked. Liquid or tablet with lower strength if available. He can not have gel capsules. 12 tablet 1    polyethylene glycol, PEG 3350, 17 g Oral Powd Pack Take 17 g by mouth daily. pantoprazole 40 MG Oral Tab EC Take one tablet (40 mg total) by mouth once daily, 30 minutes prior to breakfast. 90 tablet 2    amitriptyline 25 MG Oral Tab Take 1 tablet (25 mg total) by mouth nightly. 90 tablet 1    SITagliptin Phosphate 100 MG Oral Tab Take 1 tablet (100 mg total) by mouth daily. 90 tablet 1    hydrOXYzine 25 MG Oral Tab Take 1 tablet (25 mg total) by mouth at bedtime. 90 tablet 1    amLODIPine 5 MG Oral Tab Take 1 tablet (5 mg total) by mouth daily. (Patient taking differently: Take 1 tablet (5 mg total) by mouth daily. As needed when BP is high) 90 tablet 1    diclofenac 1 % External Gel Apply 4 g topically 3 (three) times daily as needed. 100 g 2    diclofenac 75 MG Oral Tab EC TAKE 1 TABLET(75 MG) BY MOUTH TWICE DAILY AS NEEDED FOR PAIN. 90 tablet 1    PEG 3350-KCl-Na Bicarb-NaCl 420 g Oral Recon Soln Take as directed by physician 4000 mL 0    Blood Glucose Monitoring Suppl (FREESTYLE LITE) Does not apply Device 1 strip by In Vitro route daily. Allergies    Gelatin                   Pork Derived Produc*        Review of Systems:   Pertinent items are noted in HPI. Physical Exam:   There were no vitals taken for this visit. Constitutional Normal Overall appearance - Normal.   Psychiatric Normal Orientation - Oriented to time, place, person & situation. Appropriate mood and affect. Head/Face Normal Facial features -- Normal. Skull - Normal.   Eyes Normal Pupils equal ,round ,react to light and accomidate   Ears Normal External Ear Right: Normal, Left: Normal. Canal - Right: Normal, Left: Normal. TM - Right: Normal, Left: Normal.   Nose Normal External Nose, Normal, Septum -Midline, Right, Left Turbinates - Right: Normal, Hypertrophic Left: Normal, Hypertrophic   Mouth/Throat Normal Lips/teeth/gums - Normal. Tonsils - Normal. Oropharynx - Normal.   Neck Exam Normal Inspection - Normal. Palpation - Normal. Parotid gland - Normal. Thyroid gland - Normal.   Neurological Normal Memory - Normal. Cranial nerves - Cranial nerves II through XII grossly intact. Nasopharynx Normal  Normal        Skin Normal Inspection - Normal.        Lymph Detail Normal Submental. Submandibular. Anterior cervical. Posterior cervical. Supraclavicular. Patients exam showed wax impaction right ear, wax impaction left ear. Ear wax was removed under the operative microscope. No complications. Patient tolerated the procedure well. Ear exam findings listed in note after removal.    Audiogram shows moderate to severe high-frequency loss which appears symmetric.   Results:     Laboratory Data:  Lab Results   Component Value Date    WBC 7.2 06/05/2023    HGB 13.7 06/05/2023    HCT 42.8 06/05/2023    .0 06/05/2023    CREATSERUM 0.97 06/05/2023    BUN 13 06/05/2023     06/05/2023    K 3.9 06/05/2023     06/05/2023    CO2 26.0 06/05/2023     (H) 06/05/2023    CA 9.0 06/05/2023    ALB 3.6 06/05/2023    ALKPHO 71 06/05/2023    TP 7.1 06/05/2023 AST 20 06/05/2023    ALT 27 06/05/2023    INR 1.03 06/05/2023    PTP 13.5 06/05/2023    T4F 1.0 10/29/2016    TSH 3.130 06/21/2023    LIP 65 06/05/2023    PSA 0.193 10/29/2016    ESRML 8 10/08/2021    CRP <0.29 10/08/2021    MG 2.3 10/08/2021     06/21/2023    B12 247 10/08/2021         Imaging:  No results found. Impression:   Hearing loss consistent with his age. Wax impaction resolved. Recommendations:  Recommend repeat audiogram in 2 to 3 years sooner if he notes any acute changes. Patient and son understand treatment plan. Thank you for allowing me to participate in the care of your patient.       Jesus Viveros MD  7/25/2023

## 2023-08-02 ENCOUNTER — TELEPHONE (OUTPATIENT)
Dept: FAMILY MEDICINE CLINIC | Facility: CLINIC | Age: 78
End: 2023-08-02

## 2023-08-02 ENCOUNTER — APPOINTMENT (OUTPATIENT)
Dept: GENERAL RADIOLOGY | Age: 78
End: 2023-08-02
Attending: PHYSICIAN ASSISTANT
Payer: MEDICARE

## 2023-08-02 ENCOUNTER — HOSPITAL ENCOUNTER (OUTPATIENT)
Age: 78
Discharge: HOME | End: 2023-08-02
Payer: MEDICARE

## 2023-08-02 VITALS
BODY MASS INDEX: 24.11 KG/M2 | WEIGHT: 150 LBS | HEIGHT: 66 IN | HEART RATE: 84 BPM | TEMPERATURE: 98 F | OXYGEN SATURATION: 97 % | DIASTOLIC BLOOD PRESSURE: 61 MMHG | RESPIRATION RATE: 18 BRPM | SYSTOLIC BLOOD PRESSURE: 120 MMHG

## 2023-08-02 DIAGNOSIS — B34.9 VIRAL SYNDROME: Primary | ICD-10-CM

## 2023-08-02 LAB
S PYO AG THROAT QL IA.RAPID: NEGATIVE
SARS-COV-2 RNA RESP QL NAA+PROBE: NOT DETECTED

## 2023-08-02 PROCEDURE — 71046 X-RAY EXAM CHEST 2 VIEWS: CPT | Performed by: PHYSICIAN ASSISTANT

## 2023-08-02 PROCEDURE — 99213 OFFICE O/P EST LOW 20 MIN: CPT

## 2023-08-02 PROCEDURE — 99204 OFFICE O/P NEW MOD 45 MIN: CPT

## 2023-08-02 PROCEDURE — 87651 STREP A DNA AMP PROBE: CPT | Performed by: PHYSICIAN ASSISTANT

## 2023-08-02 NOTE — TELEPHONE ENCOUNTER
Spoke to Larry Ch and discussed the [facility] but she does not want to take him quite yet so discussed pt can do some warm salt water gargles daily, warm tea with honey, throat lozenges. Monitor if symptoms get worse or does develop fever.   If so, then she will take him to the [facility] if needed

## 2023-08-02 NOTE — DISCHARGE INSTRUCTIONS
Please return to the ER/clinic if symptoms worsen. Follow-up with your PCP in 24-48 hours as needed. Push fluids. Take Motrin and or Tylenol for fever and pain. Recommend sleeping more upright. Purchase an over-the-counter antihistamine to take daily i.e. Zyrtec. Also recommend Chloraseptic spray to help stop the cough trigger reflex. Anything changes i.e. increasing fever shortness of breath etc. go directly to the emergency room. Otherwise follow-up with your primary care physician for further evaluation and treatment.

## 2023-08-02 NOTE — TELEPHONE ENCOUNTER
Patients daughter in law calling to make appt. for patient, he has sore throat & fever (did not take temp) since yesterday. Would like appt. With Dr. Juli Sanabria, first Appt. Is not until Sept.   Please advise.

## 2023-08-08 ENCOUNTER — PATIENT MESSAGE (OUTPATIENT)
Dept: FAMILY MEDICINE CLINIC | Facility: CLINIC | Age: 78
End: 2023-08-08

## 2023-08-08 DIAGNOSIS — H91.93 HEARING PROBLEM OF BOTH EARS: Primary | ICD-10-CM

## 2023-08-09 NOTE — TELEPHONE ENCOUNTER
From: [address]  To: David Zapata MD  Sent: 8/8/2023 2:16 PM CDT  Subject: Referral for hearing aids     Hi Dr. Riki Robles got an appointment with [facility] for his hearing Aide on Sep 7th. They are asking for doctor referral. Can you please give me and also send them a referral directly.    [address] [facility],[address]  [address]  [address] [facility]   [phone]    Thanks

## 2023-08-29 ENCOUNTER — HOSPITAL ENCOUNTER (OUTPATIENT)
Facility: HOSPITAL | Age: 78
Setting detail: HOSPITAL OUTPATIENT SURGERY
Discharge: HOME | End: 2023-08-29
Attending: INTERNAL MEDICINE | Admitting: INTERNAL MEDICINE
Payer: MEDICARE

## 2023-08-29 ENCOUNTER — ANESTHESIA (OUTPATIENT)
Dept: ENDOSCOPY | Facility: HOSPITAL | Age: 78
End: 2023-08-29
Payer: MEDICARE

## 2023-08-29 ENCOUNTER — ANESTHESIA EVENT (OUTPATIENT)
Dept: ENDOSCOPY | Facility: HOSPITAL | Age: 78
End: 2023-08-29
Payer: MEDICARE

## 2023-08-29 VITALS
BODY MASS INDEX: 24.11 KG/M2 | HEIGHT: 66 IN | WEIGHT: 150 LBS | SYSTOLIC BLOOD PRESSURE: 125 MMHG | OXYGEN SATURATION: 97 % | DIASTOLIC BLOOD PRESSURE: 75 MMHG | TEMPERATURE: 98 F | HEART RATE: 73 BPM | RESPIRATION RATE: 18 BRPM

## 2023-08-29 DIAGNOSIS — R10.13 EPIGASTRIC PAIN: ICD-10-CM

## 2023-08-29 LAB — GLUCOSE BLD-MCNC: 121 MG/DL (ref 70–99)

## 2023-08-29 PROCEDURE — 88305 TISSUE EXAM BY PATHOLOGIST: CPT | Performed by: INTERNAL MEDICINE

## 2023-08-29 PROCEDURE — 82962 GLUCOSE BLOOD TEST: CPT

## 2023-08-29 PROCEDURE — 88341 IMHCHEM/IMCYTCHM EA ADD ANTB: CPT | Performed by: INTERNAL MEDICINE

## 2023-08-29 PROCEDURE — 0DB68ZX EXCISION OF STOMACH, VIA NATURAL OR ARTIFICIAL OPENING ENDOSCOPIC, DIAGNOSTIC: ICD-10-PCS | Performed by: INTERNAL MEDICINE

## 2023-08-29 PROCEDURE — 88342 IMHCHEM/IMCYTCHM 1ST ANTB: CPT | Performed by: INTERNAL MEDICINE

## 2023-08-29 PROCEDURE — BD42ZZZ ULTRASONOGRAPHY OF STOMACH: ICD-10-PCS | Performed by: INTERNAL MEDICINE

## 2023-08-29 RX ORDER — DEXTROSE MONOHYDRATE 25 G/50ML
50 INJECTION, SOLUTION INTRAVENOUS
Status: DISCONTINUED | OUTPATIENT
Start: 2023-08-29 | End: 2023-08-29

## 2023-08-29 RX ORDER — SODIUM CHLORIDE, SODIUM LACTATE, POTASSIUM CHLORIDE, CALCIUM CHLORIDE 600; 310; 30; 20 MG/100ML; MG/100ML; MG/100ML; MG/100ML
INJECTION, SOLUTION INTRAVENOUS CONTINUOUS
Status: DISCONTINUED | OUTPATIENT
Start: 2023-08-29 | End: 2023-08-29

## 2023-08-29 RX ORDER — NICOTINE POLACRILEX 4 MG
30 LOZENGE BUCCAL
Status: DISCONTINUED | OUTPATIENT
Start: 2023-08-29 | End: 2023-08-29

## 2023-08-29 RX ORDER — LIDOCAINE HYDROCHLORIDE 10 MG/ML
INJECTION, SOLUTION EPIDURAL; INFILTRATION; INTRACAUDAL; PERINEURAL AS NEEDED
Status: DISCONTINUED | OUTPATIENT
Start: 2023-08-29 | End: 2023-08-29 | Stop reason: SURG

## 2023-08-29 RX ORDER — NICOTINE POLACRILEX 4 MG
15 LOZENGE BUCCAL
Status: DISCONTINUED | OUTPATIENT
Start: 2023-08-29 | End: 2023-08-29

## 2023-08-29 RX ADMIN — SODIUM CHLORIDE, SODIUM LACTATE, POTASSIUM CHLORIDE, CALCIUM CHLORIDE: 600; 310; 30; 20 INJECTION, SOLUTION INTRAVENOUS at 08:39:00

## 2023-08-29 RX ADMIN — LIDOCAINE HYDROCHLORIDE 100 MG: 10 INJECTION, SOLUTION EPIDURAL; INFILTRATION; INTRACAUDAL; PERINEURAL at 08:45:00

## 2023-08-29 NOTE — DISCHARGE INSTRUCTIONS
Home Care Instructions for  Gastroscopy with Sedation    Diet:  - Resume your regular diet as tolerated unless otherwise instructed. - Start with light meals to minimize bloating.  - Do not drink alcohol today. Medication:  - If you have questions about resuming your normal medications, please contact your Primary Care Physician. Activities:  - Take it easy today. Do not return to work today. - Do not drive today. - Do not operate any machinery today (including kitchen equipment). Colonoscopy:  - You may notice some rectal \"spotting\" (a little blood on the toilet tissue) for a day or two after the exam. This is normal.  - If you experience any rectal bleeding (not spotting), persistent tenderness or sharp severe abdominal pains, oral temperature over 100 degrees Fahrenheit, light-headedness or dizziness, or any other problems, contact your doctor. Gastroscopy:  - You may have a sore throat for 2-3 days following the exam. This is normal. Gargling with warm salt water (1/2 tsp salt to 1 glass warm water) or using throat lozenges will help. - If you experience any sharp pain in your neck, abdomen or chest, vomiting of blood, oral temperature over 100 degrees Fahrenheit, light-headedness or dizziness, or any other problems, contact your doctor. **If unable to reach your doctor, please go to the [facility] Emergency Room**    - Your referring physician will receive a full report of your examination.  - If you do not hear from your doctor's office within two weeks of your biopsy, please call them for your results. You may be able to see your laboratory results in [facility] between 4 and 7 business days. In some cases, your physician may not have viewed the results before they are released to [address] If you have questions regarding your results contact the physician who ordered the test/exam by phone or via [address] by choosing \"Ask a Medical Question. \"

## 2023-10-04 ENCOUNTER — TELEPHONE (OUTPATIENT)
Dept: FAMILY MEDICINE CLINIC | Facility: CLINIC | Age: 78
End: 2023-10-04

## 2023-10-04 NOTE — TELEPHONE ENCOUNTER
Patient has a scheduled appt on Monday 10/9/2023 with Dr. Welford Lesches patient son would like to have lab work done prior to visit.  A1C etc.. please advise

## 2023-10-05 NOTE — TELEPHONE ENCOUNTER
Patient's son requesting orders labs to be done before his appointment on 10/9/23. Orders added in July for CK, CMP and lipid panel. Son informed.

## 2023-10-07 ENCOUNTER — LAB ENCOUNTER (OUTPATIENT)
Dept: LAB | Age: 78
End: 2023-10-07
Attending: FAMILY MEDICINE
Payer: MEDICARE

## 2023-10-07 DIAGNOSIS — E78.2 MIXED HYPERLIPIDEMIA: ICD-10-CM

## 2023-10-07 LAB
ALBUMIN SERPL-MCNC: 3.8 G/DL (ref 3.4–5)
ALBUMIN/GLOB SERPL: 1 {RATIO} (ref 1–2)
ALP LIVER SERPL-CCNC: 72 U/L
ALT SERPL-CCNC: 25 U/L
ANION GAP SERPL CALC-SCNC: 5 MMOL/L (ref 0–18)
AST SERPL-CCNC: 20 U/L (ref 15–37)
BILIRUB SERPL-MCNC: 1 MG/DL (ref 0.1–2)
BUN BLD-MCNC: 16 MG/DL (ref 7–18)
CALCIUM BLD-MCNC: 8.9 MG/DL (ref 8.5–10.1)
CHLORIDE SERPL-SCNC: 104 MMOL/L (ref 98–112)
CHOLEST SERPL-MCNC: 134 MG/DL (ref ?–200)
CK SERPL-CCNC: 179 U/L
CO2 SERPL-SCNC: 27 MMOL/L (ref 21–32)
CREAT BLD-MCNC: 1.25 MG/DL
EGFRCR SERPLBLD CKD-EPI 2021: 59 ML/MIN/1.73M2 (ref 60–?)
FASTING PATIENT LIPID ANSWER: YES
FASTING STATUS PATIENT QL REPORTED: YES
GLOBULIN PLAS-MCNC: 3.7 G/DL (ref 2.8–4.4)
GLUCOSE BLD-MCNC: 130 MG/DL (ref 70–99)
HDLC SERPL-MCNC: 27 MG/DL (ref 40–59)
LDLC SERPL CALC-MCNC: 76 MG/DL (ref ?–100)
NONHDLC SERPL-MCNC: 107 MG/DL (ref ?–130)
OSMOLALITY SERPL CALC.SUM OF ELEC: 285 MOSM/KG (ref 275–295)
POTASSIUM SERPL-SCNC: 3.9 MMOL/L (ref 3.5–5.1)
PROT SERPL-MCNC: 7.5 G/DL (ref 6.4–8.2)
SODIUM SERPL-SCNC: 136 MMOL/L (ref 136–145)
TRIGL SERPL-MCNC: 181 MG/DL (ref 30–149)
VLDLC SERPL CALC-MCNC: 28 MG/DL (ref 0–30)

## 2023-10-07 PROCEDURE — 82550 ASSAY OF CK (CPK): CPT

## 2023-10-07 PROCEDURE — 80053 COMPREHEN METABOLIC PANEL: CPT

## 2023-10-07 PROCEDURE — 80061 LIPID PANEL: CPT

## 2023-10-07 PROCEDURE — 36415 COLL VENOUS BLD VENIPUNCTURE: CPT

## 2023-10-09 ENCOUNTER — OFFICE VISIT (OUTPATIENT)
Dept: FAMILY MEDICINE CLINIC | Facility: CLINIC | Age: 78
End: 2023-10-09
Payer: MEDICARE

## 2023-10-09 VITALS
HEART RATE: 84 BPM | SYSTOLIC BLOOD PRESSURE: 122 MMHG | OXYGEN SATURATION: 98 % | WEIGHT: 151 LBS | DIASTOLIC BLOOD PRESSURE: 60 MMHG | BODY MASS INDEX: 24.27 KG/M2 | HEIGHT: 66 IN | TEMPERATURE: 98 F

## 2023-10-09 DIAGNOSIS — E78.2 MIXED HYPERLIPIDEMIA: ICD-10-CM

## 2023-10-09 DIAGNOSIS — E11.9 TYPE 2 DIABETES MELLITUS WITHOUT COMPLICATION, WITHOUT LONG-TERM CURRENT USE OF INSULIN (HCC): ICD-10-CM

## 2023-10-09 DIAGNOSIS — R21 RASH: Primary | ICD-10-CM

## 2023-10-09 PROCEDURE — 99215 OFFICE O/P EST HI 40 MIN: CPT | Performed by: FAMILY MEDICINE

## 2023-10-09 RX ORDER — BLOOD SUGAR DIAGNOSTIC
STRIP MISCELLANEOUS
Qty: 100 EACH | Refills: 1 | Status: SHIPPED | OUTPATIENT
Start: 2023-10-09

## 2023-10-09 RX ORDER — LANCETS
EACH MISCELLANEOUS
Qty: 100 EACH | Refills: 1 | Status: SHIPPED | OUTPATIENT
Start: 2023-10-09

## 2023-10-09 RX ORDER — ROSUVASTATIN CALCIUM 10 MG/1
10 TABLET, COATED ORAL NIGHTLY
Qty: 90 TABLET | Refills: 1 | Status: SHIPPED | OUTPATIENT
Start: 2023-10-09

## 2023-10-09 RX ORDER — CLOBETASOL PROPIONATE 0.5 MG/G
1 CREAM TOPICAL 2 TIMES DAILY PRN
Qty: 60 G | Refills: 1 | Status: SHIPPED | OUTPATIENT
Start: 2023-10-09

## 2023-10-09 RX ORDER — AMITRIPTYLINE HYDROCHLORIDE 25 MG/1
25 TABLET, FILM COATED ORAL NIGHTLY
Qty: 90 TABLET | Refills: 1 | Status: SHIPPED | OUTPATIENT
Start: 2023-10-09

## 2023-10-09 RX ORDER — AMLODIPINE BESYLATE 5 MG/1
5 TABLET ORAL DAILY
Qty: 90 TABLET | Refills: 1 | Status: SHIPPED | OUTPATIENT
Start: 2023-10-09

## 2023-10-09 RX ORDER — BLOOD-GLUCOSE METER
KIT MISCELLANEOUS
Qty: 1 KIT | Refills: 0 | Status: SHIPPED | OUTPATIENT
Start: 2023-10-09

## 2023-10-09 NOTE — PATIENT INSTRUCTIONS
Try OTC sarna cream daily for itching - this can help    Continue to moisturize daily on damp skin    Can use clobestal cream as needed for severe itching on dry spot - try to limit use     Call to schedule with dermatologist if not improving    Continue all other meds as prescribed    Plan to go to lab for fasting bloodwork in end of Nov    Follouwp in 6 months, sooner if needed

## 2023-12-01 RX ORDER — HYDROXYZINE HYDROCHLORIDE 25 MG/1
25 TABLET, FILM COATED ORAL NIGHTLY
Qty: 90 TABLET | Refills: 0 | Status: SHIPPED | OUTPATIENT
Start: 2023-12-01

## 2024-03-20 NOTE — ED INITIAL ASSESSMENT (HPI)
Pt c/o sorethroat, fever and dry cough starting 2 days ago
Additional Notes: Patient denies active acne this month. Pt would like to complete Accutane, pt pleased with treatment.
Detail Level: Simple
Render Risk Assessment In Note?: no

## 2024-05-06 RX ORDER — ROSUVASTATIN CALCIUM 10 MG/1
10 TABLET, COATED ORAL NIGHTLY
Qty: 90 TABLET | Refills: 1 | Status: SHIPPED | OUTPATIENT
Start: 2024-05-06

## 2024-05-06 RX ORDER — HYDROXYZINE HYDROCHLORIDE 25 MG/1
25 TABLET, FILM COATED ORAL NIGHTLY
Qty: 90 TABLET | Refills: 0 | Status: SHIPPED | OUTPATIENT
Start: 2024-05-06

## 2024-05-15 ENCOUNTER — OFFICE VISIT (OUTPATIENT)
Dept: FAMILY MEDICINE CLINIC | Facility: CLINIC | Age: 79
End: 2024-05-15

## 2024-05-15 VITALS
SYSTOLIC BLOOD PRESSURE: 128 MMHG | BODY MASS INDEX: 23.65 KG/M2 | HEART RATE: 85 BPM | DIASTOLIC BLOOD PRESSURE: 72 MMHG | WEIGHT: 147.19 LBS | TEMPERATURE: 98 F | HEIGHT: 66 IN | RESPIRATION RATE: 18 BRPM | OXYGEN SATURATION: 96 %

## 2024-05-15 DIAGNOSIS — R21 RASH: ICD-10-CM

## 2024-05-15 DIAGNOSIS — E55.9 VITAMIN D DEFICIENCY: ICD-10-CM

## 2024-05-15 DIAGNOSIS — E78.2 MIXED HYPERLIPIDEMIA: ICD-10-CM

## 2024-05-15 DIAGNOSIS — I20.89 ATYPICAL ANGINA (HCC): ICD-10-CM

## 2024-05-15 DIAGNOSIS — L72.9 CYST OF SKIN: ICD-10-CM

## 2024-05-15 DIAGNOSIS — R26.81 UNSTEADINESS ON FEET: ICD-10-CM

## 2024-05-15 DIAGNOSIS — K59.09 CHRONIC CONSTIPATION: ICD-10-CM

## 2024-05-15 DIAGNOSIS — E11.9 TYPE 2 DIABETES MELLITUS WITHOUT COMPLICATION, WITHOUT LONG-TERM CURRENT USE OF INSULIN (HCC): Primary | ICD-10-CM

## 2024-05-15 LAB — HEMOGLOBIN A1C: 6.4 % (ref 4.3–5.6)

## 2024-05-15 PROCEDURE — G2211 COMPLEX E/M VISIT ADD ON: HCPCS | Performed by: FAMILY MEDICINE

## 2024-05-15 PROCEDURE — 99215 OFFICE O/P EST HI 40 MIN: CPT | Performed by: FAMILY MEDICINE

## 2024-05-15 PROCEDURE — 83036 HEMOGLOBIN GLYCOSYLATED A1C: CPT | Performed by: FAMILY MEDICINE

## 2024-05-15 RX ORDER — BLOOD-GLUCOSE METER
KIT MISCELLANEOUS
Qty: 100 STRIP | Refills: 2 | Status: SHIPPED | OUTPATIENT
Start: 2024-05-15

## 2024-05-15 RX ORDER — HYDROXYZINE HYDROCHLORIDE 25 MG/1
25 TABLET, FILM COATED ORAL NIGHTLY
Qty: 90 TABLET | Refills: 0 | Status: SHIPPED | OUTPATIENT
Start: 2024-05-15

## 2024-05-15 RX ORDER — AMITRIPTYLINE HYDROCHLORIDE 25 MG/1
25 TABLET, FILM COATED ORAL NIGHTLY
Qty: 90 TABLET | Refills: 1 | Status: SHIPPED | OUTPATIENT
Start: 2024-05-15

## 2024-05-15 RX ORDER — BLOOD-GLUCOSE METER
1 KIT MISCELLANEOUS DAILY
Qty: 1 KIT | Refills: 0 | Status: SHIPPED | OUTPATIENT
Start: 2024-05-15 | End: 2025-05-15

## 2024-05-15 RX ORDER — ROSUVASTATIN CALCIUM 10 MG/1
10 TABLET, COATED ORAL NIGHTLY
Qty: 90 TABLET | Refills: 1 | Status: SHIPPED | OUTPATIENT
Start: 2024-05-15

## 2024-05-15 RX ORDER — AMLODIPINE BESYLATE 2.5 MG/1
2.5 TABLET ORAL DAILY
Qty: 90 TABLET | Refills: 1 | Status: SHIPPED | OUTPATIENT
Start: 2024-05-15

## 2024-05-15 RX ORDER — CLOBETASOL PROPIONATE 0.5 MG/G
1 CREAM TOPICAL 2 TIMES DAILY PRN
Qty: 60 G | Refills: 1 | Status: SHIPPED | OUTPATIENT
Start: 2024-05-15

## 2024-05-15 RX ORDER — LANCETS 28 GAUGE
1 EACH MISCELLANEOUS DAILY
Qty: 100 EACH | Refills: 2 | Status: SHIPPED | OUTPATIENT
Start: 2024-05-15 | End: 2025-05-15

## 2024-05-15 RX ORDER — PANTOPRAZOLE SODIUM 40 MG/1
TABLET, DELAYED RELEASE ORAL
Qty: 90 TABLET | Refills: 3 | Status: SHIPPED | OUTPATIENT
Start: 2024-05-15

## 2024-05-15 NOTE — PROGRESS NOTES
Jessica Fine is a 79 year old male here for   Chief Complaint   Patient presents with    Medication Follow-Up    Follow - Up     Diabetes, blood sugar 99 this morning       HPI:       1. Type 2 diabetes mellitus without complication, without long-term current use of insulin (HCC)  -Last A1c value was 6.4% done 5/15/2024.    -last eye exam: Last Diabetic Eye Exam:  Last Dilated Eye Exam: 06/20/23  Eye Exam shows Diabetic Retinopathy?: No    -denies hyper or hypo glycemic symptoms     2. Rash  -clobetasol helped    3. Cyst of skin  -cyst in back of neck getting bigger    4. Atypical angina (HCC)  5. Unsteadiness on feet  6. Mixed hyperlipidemia  -strong fam hx of stroke  -interested in carotid US    7. Chronic constipation  -not improving with miralax prn    8. Vitamin D deficiency  -due for labs          HISTORY:  Past Medical History:    Abdominal pain    Anxiety    Arthritis    Back injury    1960s    Calculus of kidney    Constipation    Diabetes (HCC)    Fatigue    Flatulence/gas pain/belching    Frequent urination    Hearing loss    Heartburn    High blood pressure    High cholesterol    Hyperlipidemia    Border level    Irregular bowel habits    Loss of appetite    Pain in joints    Rash    Shortness of breath    Sleep apnea    Uncomfortable fullness after meals    Wears glasses    Weight gain      Past Surgical History:   Procedure Laterality Date    Colonoscopy  2014    Colonoscopy N/A 7/18/2023    Procedure: COLONOSCOPY;  Surgeon: Alessio Hernandez MD;  Location:  ENDOSCOPY    Egd        Family History   Problem Relation Age of Onset    Diabetes Mother     Hypertension Mother     Heart Attack Father     Colon Cancer Sister     Hypertension Brother       Social History:   Social History     Socioeconomic History    Marital status:    Tobacco Use    Smoking status: Never    Smokeless tobacco: Never   Vaping Use    Vaping status: Never Used   Substance and Sexual Activity    Alcohol use: Never    Drug  use: Never   Other Topics Concern    Caffeine Concern Yes     Comment: Take 2 small cups of tea  day    Stress Concern No    Weight Concern No    Special Diet Yes     Comment: No spices or irby food    Exercise No     Comment: Everyday walk    Seat Belt Yes        Medications (Active prior to today's visit):  Current Outpatient Medications   Medication Sig Dispense Refill    amLODIPine 2.5 MG Oral Tab Take 1 tablet (2.5 mg total) by mouth daily. 90 tablet 1    Glucose Blood (FREESTYLE LITE TEST) In Vitro Strip Use as directed once daily. Dx: 11.65 100 strip 2    FreeStyle Lancets Does not apply Misc 1 each by Finger stick route daily. E11.65 100 each 2    Blood Glucose Monitoring Suppl (FREESTYLE FREEDOM LITE) w/Device Does not apply Kit 1 Device by Other route daily. E11.65 1 kit 0    SITagliptin Phosphate 100 MG Oral Tab Take 1 tablet (100 mg total) by mouth daily. 90 tablet 1    rosuvastatin 10 MG Oral Tab Take 1 tablet (10 mg total) by mouth nightly. 90 tablet 1    pantoprazole 40 MG Oral Tab EC Take one tablet (40 mg total) by mouth once daily, 30 minutes prior to breakfast. 90 tablet 3    hydrOXYzine 25 MG Oral Tab Take 1 tablet (25 mg total) by mouth nightly. 90 tablet 0    diclofenac 1 % External Gel Apply 4 g topically 3 (three) times daily as needed. 100 g 2    clobetasol 0.05 % External Cream Apply 1 Application topically 2 (two) times daily as needed. 60 g 1    amitriptyline 25 MG Oral Tab Take 1 tablet (25 mg total) by mouth nightly. 90 tablet 1    triamcinolone 0.1 % External Cream APPLY TO THE AFFECTED AREA TWICE DAILY FOR 1 TO 2 WEEKS 454 g 1    polyethylene glycol, PEG 3350, 17 g Oral Powd Pack Take 17 g by mouth daily.      diclofenac 75 MG Oral Tab EC TAKE 1 TABLET(75 MG) BY MOUTH TWICE DAILY AS NEEDED FOR PAIN. 90 tablet 1    Glucose Blood (ONETOUCH ULTRA) In Vitro Strip Use once daily as directed, Dx: E11.65 (Patient not taking: Reported on 5/15/2024) 100 each 1    Blood Glucose Monitoring Suppl  (ONETOUCH ULTRA MINI) w/Device Does not apply Kit Use as directed, Dx: E11.65 (Patient not taking: Reported on 5/15/2024) 1 kit 0    OneTouch UltraSoft Lancets Does not apply Misc Use once daily as directed, Dx: E11.65 (Patient not taking: Reported on 5/15/2024) 100 each 1    Blood Glucose Monitoring Suppl (FREESTYLE LITE) Does not apply Device 1 strip by In Vitro route daily. (Patient not taking: Reported on 5/15/2024)         Allergies:  Allergies   Allergen Reactions    Gelatin     Pork Derived Products          ROS:   See HPI for relevant ROS    --GEN: No other complaints  --HEENT: No other complaints  --RESP: No other complaints  --CV: No other complaints  --GI: No other complaints  --MSK: No other complaints    All other systems reviewed are negative    PHYSICAL EXAM:   /72 (BP Location: Left arm, Patient Position: Sitting, Cuff Size: adult)   Pulse 85   Temp 97.6 °F (36.4 °C) (Temporal)   Resp 18   Ht 5' 6\" (1.676 m)   Wt 147 lb 3.2 oz (66.8 kg)   SpO2 96%   BMI 23.76 kg/m²     Gen: NAD  HEENT: NCAT, pupils equal and round  Pulm: CTAB, no wheezing  CV: RRR  Ext: full ROM  Psych: normal affect     ASSESSMENT/PLAN:     1. Type 2 diabetes mellitus without complication, without long-term current use of insulin (HCC)  -at goal  -c/w meds  -up to date on eye and foot exam    2. Rash  -stable  -c/w clobetasol prn    3. Cyst of skin  - SURGERY - INTERNAL    4. Atypical angina (HCC)  5. Unsteadiness on feet  6. Mixed hyperlipidemia  -c/w statin  -check carotid US  -f/u with cardiologist as planned    7. Chronic constipation  -start miralax daily    8. Vitamin D deficiency  - Vitamin D; Future          Chronic Conditions:    No problem-specific Assessment & Plan notes found for this encounter.       Health Maintenance:  Health Maintenance   Topic Date Due    COVID-19 Vaccine (3 - 2023-24 season) 09/01/2023    Diabetes Care A1C  12/21/2023    Annual Depression Screening  01/01/2024    Fall Risk Screening  (Annual)  2024    Diabetes Care Dilated Eye Exam  2024    Annual Physical  2024    Pneumococcal Vaccine: 65+ Years (1 of 2 - PCV) 2024 (Originally 1/15/1951)    Zoster Vaccines (1 of 2) 2024 (Originally 1/15/1994)    Diabetes Care Foot Exam  2024    Diabetes Care: Microalb/Creat Ratio  2024    Influenza Vaccine (Season Ended) 10/01/2024    Diabetes Care: GFR  10/07/2024    Colorectal Cancer Screening  2028               The patient is asked to return in 2 months - check labs prior to next appt.    Orders This Visit:  Orders Placed This Encounter   Procedures    POC Hgb A1C    CBC With Differential With Platelet    Comp Metabolic Panel (14)    Lipid Panel    Vitamin D    TSH W Reflex To Free T4       Meds This Visit:  Requested Prescriptions     Signed Prescriptions Disp Refills    amLODIPine 2.5 MG Oral Tab 90 tablet 1     Sig: Take 1 tablet (2.5 mg total) by mouth daily.    Glucose Blood (FREESTYLE LITE TEST) In Vitro Strip 100 strip 2     Sig: Use as directed once daily. Dx: 11.65    FreeStyle Lancets Does not apply Misc 100 each 2     Si each by Finger stick route daily. E11.65    Blood Glucose Monitoring Suppl (FREESTYLE FREEDOM LITE) w/Device Does not apply Kit 1 kit 0     Si Device by Other route daily. E11.65    SITagliptin Phosphate 100 MG Oral Tab 90 tablet 1     Sig: Take 1 tablet (100 mg total) by mouth daily.    rosuvastatin 10 MG Oral Tab 90 tablet 1     Sig: Take 1 tablet (10 mg total) by mouth nightly.    pantoprazole 40 MG Oral Tab EC 90 tablet 3     Sig: Take one tablet (40 mg total) by mouth once daily, 30 minutes prior to breakfast.    hydrOXYzine 25 MG Oral Tab 90 tablet 0     Sig: Take 1 tablet (25 mg total) by mouth nightly.    diclofenac 1 % External Gel 100 g 2     Sig: Apply 4 g topically 3 (three) times daily as needed.    clobetasol 0.05 % External Cream 60 g 1     Sig: Apply 1 Application topically 2 (two) times daily as needed.     amitriptyline 25 MG Oral Tab 90 tablet 1     Sig: Take 1 tablet (25 mg total) by mouth nightly.       Imaging & Referrals:  OPHTHALMOLOGY - INTERNAL  SURGERY - INTERNAL  US CAROTID DOPPLER BILAT - DIAG IMG (CPT=93880)     INDIRA THORNTON MD    I spent a total of 40 minutes, more than half of which was spent counseling/coordinating care regarding dm, lipids, cyst, rash, constipation

## 2024-05-15 NOTE — PATIENT INSTRUCTIONS
A1c 6.4    Start probiotics daily for 4-6 wks to see if helps constipation    Miralax every day    Call to followup with eye doctor    Call to schedule ultrasound of neck    Go to lab 1 week before next appt    Followup in July as planned

## 2024-05-23 ENCOUNTER — PATIENT MESSAGE (OUTPATIENT)
Dept: FAMILY MEDICINE CLINIC | Facility: CLINIC | Age: 79
End: 2024-05-23

## 2024-05-23 RX ORDER — MELOXICAM 7.5 MG/1
TABLET ORAL DAILY PRN
Qty: 30 TABLET | Refills: 1 | Status: SHIPPED | OUTPATIENT
Start: 2024-05-23

## 2024-05-23 NOTE — TELEPHONE ENCOUNTER
From: Jessica Fine  To: INDIRA THORNTON  Sent: 5/23/2024 12:29 PM CDT  Subject: Knee pain Meloxicam refill     Hi Dr. Thornton    As we mentioned knee pain in last visit, it is getting worse. With meloxicam tried for 2 days it is helping that you asked me to try as you prescribed me for knee pain couple months back. Can you please send prescription for my dad for this medicine. Thanks  Naveed

## 2024-05-23 NOTE — TELEPHONE ENCOUNTER
Patient's son is requesting a prescription of meloxicam for patient, as it is helping him. Please advise, thanks.

## 2024-05-25 ENCOUNTER — LAB ENCOUNTER (OUTPATIENT)
Dept: LAB | Age: 79
End: 2024-05-25
Attending: FAMILY MEDICINE

## 2024-05-25 DIAGNOSIS — E78.2 MIXED HYPERLIPIDEMIA: ICD-10-CM

## 2024-05-25 DIAGNOSIS — E55.9 VITAMIN D DEFICIENCY: ICD-10-CM

## 2024-05-25 LAB
ALBUMIN SERPL-MCNC: 4 G/DL (ref 3.4–5)
ALBUMIN/GLOB SERPL: 1.1 {RATIO} (ref 1–2)
ALP LIVER SERPL-CCNC: 69 U/L
ALT SERPL-CCNC: 23 U/L
ANION GAP SERPL CALC-SCNC: 8 MMOL/L (ref 0–18)
AST SERPL-CCNC: 21 U/L (ref 15–37)
BASOPHILS # BLD AUTO: 0.07 X10(3) UL (ref 0–0.2)
BASOPHILS NFR BLD AUTO: 0.9 %
BILIRUB SERPL-MCNC: 1 MG/DL (ref 0.1–2)
BUN BLD-MCNC: 10 MG/DL (ref 9–23)
CALCIUM BLD-MCNC: 9.4 MG/DL (ref 8.5–10.1)
CHLORIDE SERPL-SCNC: 103 MMOL/L (ref 98–112)
CHOLEST SERPL-MCNC: 127 MG/DL (ref ?–200)
CO2 SERPL-SCNC: 24 MMOL/L (ref 21–32)
CREAT BLD-MCNC: 0.97 MG/DL
EGFRCR SERPLBLD CKD-EPI 2021: 79 ML/MIN/1.73M2 (ref 60–?)
EOSINOPHIL # BLD AUTO: 0.43 X10(3) UL (ref 0–0.7)
EOSINOPHIL NFR BLD AUTO: 5.7 %
ERYTHROCYTE [DISTWIDTH] IN BLOOD BY AUTOMATED COUNT: 16.2 %
FASTING PATIENT LIPID ANSWER: YES
FASTING STATUS PATIENT QL REPORTED: YES
GLOBULIN PLAS-MCNC: 3.5 G/DL (ref 2.8–4.4)
GLUCOSE BLD-MCNC: 109 MG/DL (ref 70–99)
HCT VFR BLD AUTO: 45.2 %
HDLC SERPL-MCNC: 34 MG/DL (ref 40–59)
HGB BLD-MCNC: 14.7 G/DL
IMM GRANULOCYTES # BLD AUTO: 0.02 X10(3) UL (ref 0–1)
IMM GRANULOCYTES NFR BLD: 0.3 %
LDLC SERPL CALC-MCNC: 72 MG/DL (ref ?–100)
LYMPHOCYTES # BLD AUTO: 2.09 X10(3) UL (ref 1–4)
LYMPHOCYTES NFR BLD AUTO: 27.9 %
MCH RBC QN AUTO: 26.8 PG (ref 26–34)
MCHC RBC AUTO-ENTMCNC: 32.5 G/DL (ref 31–37)
MCV RBC AUTO: 82.5 FL
MONOCYTES # BLD AUTO: 0.93 X10(3) UL (ref 0.1–1)
MONOCYTES NFR BLD AUTO: 12.4 %
NEUTROPHILS # BLD AUTO: 3.94 X10 (3) UL (ref 1.5–7.7)
NEUTROPHILS # BLD AUTO: 3.94 X10(3) UL (ref 1.5–7.7)
NEUTROPHILS NFR BLD AUTO: 52.8 %
NONHDLC SERPL-MCNC: 93 MG/DL (ref ?–130)
OSMOLALITY SERPL CALC.SUM OF ELEC: 280 MOSM/KG (ref 275–295)
PLATELET # BLD AUTO: 278 10(3)UL (ref 150–450)
POTASSIUM SERPL-SCNC: 4.1 MMOL/L (ref 3.5–5.1)
PROT SERPL-MCNC: 7.5 G/DL (ref 6.4–8.2)
RBC # BLD AUTO: 5.48 X10(6)UL
SODIUM SERPL-SCNC: 135 MMOL/L (ref 136–145)
TRIGL SERPL-MCNC: 114 MG/DL (ref 30–149)
TSI SER-ACNC: 2.72 MIU/ML (ref 0.36–3.74)
VIT D+METAB SERPL-MCNC: 87.4 NG/ML (ref 30–100)
VLDLC SERPL CALC-MCNC: 17 MG/DL (ref 0–30)
WBC # BLD AUTO: 7.5 X10(3) UL (ref 4–11)

## 2024-05-25 PROCEDURE — 82306 VITAMIN D 25 HYDROXY: CPT

## 2024-05-25 PROCEDURE — 36415 COLL VENOUS BLD VENIPUNCTURE: CPT

## 2024-05-25 PROCEDURE — 85025 COMPLETE CBC W/AUTO DIFF WBC: CPT

## 2024-05-25 PROCEDURE — 84443 ASSAY THYROID STIM HORMONE: CPT

## 2024-05-25 PROCEDURE — 80053 COMPREHEN METABOLIC PANEL: CPT

## 2024-05-25 PROCEDURE — 80061 LIPID PANEL: CPT

## 2024-05-26 ENCOUNTER — HOSPITAL ENCOUNTER (OUTPATIENT)
Dept: ULTRASOUND IMAGING | Age: 79
Discharge: HOME | End: 2024-05-26
Attending: FAMILY MEDICINE

## 2024-05-26 ENCOUNTER — HOSPITAL ENCOUNTER (OUTPATIENT)
Dept: ULTRASOUND IMAGING | Age: 79
End: 2024-05-26
Attending: FAMILY MEDICINE

## 2024-05-26 DIAGNOSIS — E78.2 MIXED HYPERLIPIDEMIA: ICD-10-CM

## 2024-05-26 DIAGNOSIS — R26.81 UNSTEADINESS ON FEET: ICD-10-CM

## 2024-05-26 DIAGNOSIS — I20.89 ATYPICAL ANGINA (HCC): ICD-10-CM

## 2024-05-26 PROCEDURE — 93880 EXTRACRANIAL BILAT STUDY: CPT | Performed by: FAMILY MEDICINE

## 2024-07-29 ENCOUNTER — OFFICE VISIT (OUTPATIENT)
Dept: FAMILY MEDICINE CLINIC | Facility: CLINIC | Age: 79
End: 2024-07-29
Payer: MEDICARE

## 2024-07-29 VITALS
WEIGHT: 153.19 LBS | SYSTOLIC BLOOD PRESSURE: 122 MMHG | OXYGEN SATURATION: 95 % | TEMPERATURE: 98 F | BODY MASS INDEX: 24.62 KG/M2 | DIASTOLIC BLOOD PRESSURE: 66 MMHG | HEIGHT: 66 IN | RESPIRATION RATE: 18 BRPM | HEART RATE: 80 BPM

## 2024-07-29 DIAGNOSIS — M25.511 CHRONIC PAIN OF BOTH SHOULDERS: ICD-10-CM

## 2024-07-29 DIAGNOSIS — M25.561 CHRONIC PAIN OF BOTH KNEES: ICD-10-CM

## 2024-07-29 DIAGNOSIS — G89.29 CHRONIC PAIN OF BOTH SHOULDERS: ICD-10-CM

## 2024-07-29 DIAGNOSIS — E11.9 TYPE 2 DIABETES MELLITUS WITHOUT COMPLICATION, WITHOUT LONG-TERM CURRENT USE OF INSULIN (HCC): ICD-10-CM

## 2024-07-29 DIAGNOSIS — M25.512 CHRONIC PAIN OF BOTH SHOULDERS: ICD-10-CM

## 2024-07-29 DIAGNOSIS — K21.9 GASTROESOPHAGEAL REFLUX DISEASE WITHOUT ESOPHAGITIS: ICD-10-CM

## 2024-07-29 DIAGNOSIS — G89.29 CHRONIC PAIN OF BOTH KNEES: ICD-10-CM

## 2024-07-29 DIAGNOSIS — N35.919 STRICTURE OF MALE URETHRA, UNSPECIFIED STRICTURE TYPE: ICD-10-CM

## 2024-07-29 DIAGNOSIS — M25.562 CHRONIC PAIN OF BOTH KNEES: ICD-10-CM

## 2024-07-29 DIAGNOSIS — E78.2 MIXED HYPERLIPIDEMIA: ICD-10-CM

## 2024-07-29 DIAGNOSIS — L72.9 CYST OF SKIN: ICD-10-CM

## 2024-07-29 DIAGNOSIS — Z00.00 ENCOUNTER FOR ANNUAL HEALTH EXAMINATION: Primary | ICD-10-CM

## 2024-07-29 DIAGNOSIS — I20.89 ATYPICAL ANGINA (HCC): ICD-10-CM

## 2024-07-29 DIAGNOSIS — E55.9 VITAMIN D DEFICIENCY: ICD-10-CM

## 2024-07-29 LAB
CREAT UR-SCNC: 27.6 MG/DL
MICROALBUMIN UR-MCNC: 0.6 MG/DL
MICROALBUMIN/CREAT 24H UR-RTO: 21.7 UG/MG (ref ?–30)

## 2024-07-29 PROCEDURE — 82570 ASSAY OF URINE CREATININE: CPT | Performed by: FAMILY MEDICINE

## 2024-07-29 PROCEDURE — 82043 UR ALBUMIN QUANTITATIVE: CPT | Performed by: FAMILY MEDICINE

## 2024-07-29 RX ORDER — ROSUVASTATIN CALCIUM 10 MG/1
10 TABLET, COATED ORAL NIGHTLY
Qty: 90 TABLET | Refills: 1 | Status: SHIPPED | OUTPATIENT
Start: 2024-07-29

## 2024-07-29 RX ORDER — NAPROXEN 500 MG/1
TABLET ORAL 2 TIMES DAILY PRN
Qty: 60 TABLET | Refills: 2 | Status: SHIPPED | OUTPATIENT
Start: 2024-07-29

## 2024-07-29 RX ORDER — AMLODIPINE BESYLATE 2.5 MG/1
2.5 TABLET ORAL DAILY
Qty: 90 TABLET | Refills: 1 | Status: SHIPPED | OUTPATIENT
Start: 2024-07-29

## 2024-07-29 RX ORDER — PANTOPRAZOLE SODIUM 40 MG/1
TABLET, DELAYED RELEASE ORAL
Qty: 90 TABLET | Refills: 3 | Status: SHIPPED | OUTPATIENT
Start: 2024-07-29

## 2024-07-29 RX ORDER — HYDROXYZINE HYDROCHLORIDE 25 MG/1
25 TABLET, FILM COATED ORAL NIGHTLY
Qty: 90 TABLET | Refills: 0 | Status: SHIPPED | OUTPATIENT
Start: 2024-07-29

## 2024-07-29 RX ORDER — ASPIRIN 81 MG
100 TABLET, DELAYED RELEASE (ENTERIC COATED) ORAL 2 TIMES DAILY
Qty: 180 TABLET | Refills: 1 | Status: SHIPPED | OUTPATIENT
Start: 2024-07-29

## 2024-07-29 RX ORDER — AMITRIPTYLINE HYDROCHLORIDE 25 MG/1
25 TABLET, FILM COATED ORAL NIGHTLY
Qty: 90 TABLET | Refills: 1 | Status: SHIPPED | OUTPATIENT
Start: 2024-07-29

## 2024-07-29 NOTE — PROGRESS NOTES
Subjective:   Jessica Fine is a 79 year old male who presents for a Medicare Subsequent Annual Wellness visit (Pt already had Initial Annual Wellness).     1. Encounter for annual health examination  -due    2. Type 2 diabetes mellitus without complication, without long-term current use of insulin (HCC)  -fasting sugars 100-130s    3. Atypical chest pain  Atypical angina  4. Mixed hyperlipidemia  -no new symptoms  -back on statin    5. Gastroesophageal reflux disease without esophagitis  -stable on pantoprazole    6. Chronic pain of both shoulders  -stable without pain    7. Vitamin D deficiency  -recent Vit D low    8. Stricture of male urethra, unspecified stricture type  -no new issues  -still dilating once a month  -past due to see urology    9. Cyst of skin  -drained on it's own last month    10. Chronic pain of both knees  -worsening  -helped by PT years ago      History/Other:   Fall Risk Assessment:   He has been screened for Falls and is low risk.      Cognitive Assessment:   He had a completely normal cognitive assessment - see flowsheet entries       Functional Ability/Status:   Jessica Fine has some abnormal functions as listed below:  He has Driving difficulties based on screening of functional status. He has Meal Preparation difficulties based on screening of functional status.He has difficulties Shopping for Groceries based on screening of functional status. He has difficulties Affording Meds based on screening of functional status. He has Hearing problems based on screening of functional status.He has Vision problems based on screening of functional status.       Depression Screening (PHQ-2/PHQ-9): PHQ-9 TOTAL SCORE: 3  , done 7/29/2024   Trouble falling or staying asleep, or sleeping too much: 3     If you checked off any problems, how difficult have these problems made it for you to do your work, take care of things at home, or get along with other people?: Not difficult at all    Last  [place] Suicide Screening on 7/29/2024 was No Risk.       Advanced Directives:   He does NOT have a Living Will. [Do you have a living will?: No]  He does NOT have a Power of  for Health Care. [Do you have a healthcare power of ?: No]  Discussed Advance Care Planning with patient (and family/surrogate if present). Standard forms made available to patient in After Visit Summary.      Patient Active Problem List   Diagnosis    Gastroesophageal reflux disease without esophagitis    Numbness in feet    Dry eye    Dermatitis    Chronic right shoulder pain    Dry eye syndrome of bilateral lacrimal glands    Type 2 diabetes mellitus without complication, without long-term current use of insulin (HCC)    Mixed hyperlipidemia    Chronic constipation    Abnormal EKG    Atypical angina ([facility])    Epigastric abdominal pain    Shortness of breath     Allergies:  He is allergic to gelatin and pork derived products.    Current Medications:  Outpatient Medications Marked as Taking for the 7/29/24 encounter (Office Visit) with Ahsan Loving MD   Medication Sig    naproxen 500 MG Oral Tab Take 0.5-1 tablets (250-500 mg total) by mouth 2 (two) times daily as needed.    SITagliptin Phosphate 100 MG Oral Tab Take 1 tablet (100 mg total) by mouth daily.    rosuvastatin 10 MG Oral Tab Take 1 tablet (10 mg total) by mouth nightly.    pantoprazole 40 MG Oral Tab EC Take one tablet (40 mg total) by mouth once daily, 30 minutes prior to breakfast.    hydrOXYzine 25 MG Oral Tab Take 1 tablet (25 mg total) by mouth nightly.    diclofenac 1 % External Gel Apply 4 g topically 3 (three) times daily as needed.    amLODIPine 2.5 MG Oral Tab Take 1 tablet (2.5 mg total) by mouth daily.    amitriptyline 25 MG Oral Tab Take 1 tablet (25 mg total) by mouth nightly.    docusate sodium 100 MG Oral Tab Take 1 tablet (100 mg total) by mouth 2 (two) times daily.    clobetasol 0.05 % External Cream Apply 1 Application topically 2 (two) times  daily as needed.    triamcinolone 0.1 % External Cream APPLY TO THE AFFECTED AREA TWICE DAILY FOR 1 TO 2 WEEKS    polyethylene glycol, PEG 3350, 17 g Oral Powd Pack Take 17 g by mouth daily.       Medical History:  He  has a past medical history of Abdominal pain, Anxiety, Arthritis, Back injury, Calculus of kidney, Constipation, COPD (chronic obstructive pulmonary disease) (HCC), Diabetes (HCC), Fatigue, Flatulence/gas pain/belching, Frequent urination, Hearing loss, Heartburn, High blood pressure, High cholesterol, Hyperlipidemia (2000), Irregular bowel habits, Loss of appetite, Pain in joints, Rash, Shortness of breath, Sleep apnea, Uncomfortable fullness after meals, Wears glasses, and Weight gain.  Surgical History:  He  has a past surgical history that includes colonoscopy (2014); egd; and colonoscopy (N/A, 7/18/2023).   Family History:  His family history includes Colon Cancer in his sister; Diabetes in his mother; Heart Attack in his father; Hypertension in his brother and mother.  Social History:  He  reports that he has never smoked. He has never used smokeless tobacco. He reports that he does not drink alcohol and does not use drugs.    Tobacco:  He has never smoked tobacco.    CAGE Alcohol Screen:   CAGE screening score of 0 on 7/29/2024, showing low risk of alcohol abuse.      Patient Care Team:  Ahsan Loving MD as PCP - General (Family Medicine)  Hilario Gibbons, PT as Physical Therapist (Physical Therapy)  Mono Shine PT as Physical Therapist (Physical Therapy)  Estelita Dia PT as Physical Therapist (Physical Therapy)  Alessio Hernandez MD (GASTROENTEROLOGY)  Casey Giron MD (CARDIOLOGY)    Review of Systems     Negative except above    Objective:   Physical Exam  General Appearance:  Alert, cooperative, no distress, appears stated age   Head:  Normocephalic, without obvious abnormality, atraumatic   Eyes:  PERRL   Ears:  Normal TM's and external ear canals, both ears   Lungs:   Clear to  auscultation bilaterally, respirations unlabored   Heart:  Regular rate and rhythm   Extremities: Extremities normal, no cyanosis or edema   Skin: Skin color, texture, turgor normal   Neurologic: No appreciable abnormality     Bilateral barefoot skin diabetic exam is normal, visualized feet and the appearance is normal.  Bilateral monofilament/sensation of both feet is normal.  Pulsation pedal pulse exam of both lower legs/feet is normal as well.      /66 (BP Location: Left arm, Patient Position: Sitting, Cuff Size: adult)   Pulse 80   Temp 98.3 °F (36.8 °C) (Temporal)   Resp 18   Ht 5' 6\" (1.676 m)   Wt 153 lb 3.2 oz (69.5 kg)   SpO2 95%   BMI 24.73 kg/m²  Estimated body mass index is 24.73 kg/m² as calculated from the following:    Height as of this encounter: 5' 6\" (1.676 m).    Weight as of this encounter: 153 lb 3.2 oz (69.5 kg).    Medicare Hearing Assessment:   Hearing Screening    Screening Method: Finger Rub  Finger Rub Result: Pass               Assessment & Plan:   Jessica Fine is a 79 year old male who presents for a Medicare Assessment.     1. Encounter for annual health examination  -reviewed age appropriate screening  -check labs  -reviewed dpoa, living will    2. Type 2 diabetes mellitus without complication, without long-term current use of insulin (HCC)  -c/w januvia   -A1c at goal at 7  -due for eye exam  -up to date on foot exam    3. Atypical chest pain  Atypical angina  4. Mixed hyperlipidemia  -stable  -restart crestor at 10mg daily  -recheck labs including cpk (which was high in [place])  -check labs in 3 months  -f/u with cardiology prn    5. Gastroesophageal reflux disease without esophagitis  -stable  -c/w pantoprazole    6. Chronic pain of both shoulders  -stable, c/w diclofenac gel prn    7. Vitamin D deficiency  -restart vit D repletion dosing    8. Stricture of male urethra, unspecified stricture type  -better  -f/u with urology prn    9. Cyst of skin  -drained on it's  own last month    10. Chronic pain of both knees  -worsening  -helped by PT years ago    The patient indicates understanding of these issues and agrees to the plan.  Reinforced healthy diet, lifestyle, and exercise.      Return in about 6 months (around 1/29/2025).     INDIRA THORNTON MD, 7/25/2022     Supplementary Documentation:   General Health:  In the past six months, have you lost more than 10 pounds without trying?: 2 - No  Has your appetite been poor?: Yes  Type of Diet: Balanced;Low Carb  How does the patient maintain a good energy level?: Daily Walks  How would you describe your daily physical activity?: Light  How would you describe your current health state?: Fair  How do you maintain positive mental well-being?: Visiting Family  On a scale of 0 to 10, with 0 being no pain and 10 being severe pain, what is your pain level?: 4 - (Moderate)  In the past six months, have you experienced urine leakage?: 0-No  At any time do you feel concerned for the safety/well-being of yourself and/or your children, in your home or elsewhere?: No  Have you had any immunizations at another office such as Influenza, Hepatitis B, Tetanus, or Pneumococcal?: No        Jessica Fine's SCREENING SCHEDULE   Tests on this list are recommended by your physician but may not be covered, or covered at this frequency, by your insurer.   Please check with your insurance carrier before scheduling to verify coverage.   PREVENTATIVE SERVICES FREQUENCY &  COVERAGE DETAILS LAST COMPLETION DATE   Diabetes Screening    Fasting Blood Sugar / Glucose    One screening every 12 months if never tested or if previously tested but not diagnosed with pre-diabetes   One screening every 6 months if diagnosed with pre-diabetes Lab Results   Component Value Date     (H) 05/25/2024        Cardiovascular Disease Screening    Lipid Panel  Cholesterol  Lipoprotein (HDL)  Triglycerides Covered every 5 years for all Medicare beneficiaries without  apparent signs or symptoms of cardiovascular disease Lab Results   Component Value Date    CHOLEST 127 05/25/2024    HDL 34 (L) 05/25/2024    LDL 72 05/25/2024    LDLD 83 03/13/2023    TRIG 114 05/25/2024         Electrocardiogram (EKG)   Covered if needed at Welcome to Medicare, and non-screening if indicated for medical reasons 08/18/2021      Ultrasound Screening for Abdominal Aortic Aneurysm (AAA) Covered once in a lifetime for one of the following risk factors    Men who are 65-75 years old and have ever smoked    Anyone with a family history -     Colorectal Cancer Screening  Covered for ages 50-85; only need ONE of the following:    Colonoscopy   Covered every 10 years    Covered every 2 years if patient is at high risk or previous colonoscopy was abnormal 07/18/2023    Health Maintenance   Topic Date Due    Colorectal Cancer Screening  07/18/2028       Flexible Sigmoidoscopy   Covered every 4 years -    Fecal Occult Blood Test Covered annually -   Prostate Cancer Screening    Prostate-Specific Antigen (PSA) Annually Lab Results   Component Value Date    PSA 0.193 10/29/2016     There are no preventive care reminders to display for this patient.   Immunizations    Influenza Covered once per flu season  Please get every year -  No recommendations at this time    Pneumococcal Each vaccine (Inpfmnj38 & Vpldgrnil05) covered once after 65 Prevnar 13: -    Shtyfbyoo86: -     Pneumococcal Vaccination(1 of 2 - PCV) Never done    Hepatitis B One screening covered for patients with certain risk factors   -  No recommendations at this time    Tetanus Toxoid Not covered by Medicare Part B unless medically necessary (cut with metal); may be covered with your pharmacy prescription benefits -    Tetanus, Diptheria and Pertusis TD and TDaP Not covered by Medicare Part B -  No recommendations at this time    Zoster Not covered by Medicare Part B; may be covered with your pharmacy  prescription benefits -  Zoster Vaccines(1 of  2) Never done     Diabetes      Hemoglobin A1C Annually; if last result is elevated, may be asked to retest more frequently.    Medicare covers every 3 months Lab Results   Component Value Date     (H) 06/21/2023    A1C 6.4 (A) 05/15/2024       No recommendations at this time    Creat/alb ratio Annually Lab Results   Component Value Date    MICROALBCREA 39.8 (H) 06/21/2023       LDL Annually Lab Results   Component Value Date    LDL 72 05/25/2024       Dilated Eye Exam Annually Last Diabetic Eye Exam:  No data recorded  No data recorded           Overall 60 minutes was spent on this encounter, 40 mins spent reviewing and providing care coordination for these conditions: dm, gerd, knee pain, cyst, urethra  20  minutes was spent reviewing wellness elements and providing age appropriate screenings.

## 2024-07-29 NOTE — PATIENT INSTRUCTIONS
Continue all medication as prescribed    Naproxen as needed for pain - with food    Hydroxyzine as needed for itching, anxiety or sleep    Amitriptyline as needed for sleep    Can add melatonin nightly if having a lot of sleep movements/nightmares to see if it helps    Followup with cardiologist as planned    We will try docusate 2x/day instead of miralax  Can add miralax to this if needed 1-2x/day until soft regular bowel movements    Call to schedule with cardiology and urology    Call to schedule physical therapy for knee pain    See me in 6 months, sooner if needed

## 2025-01-21 RX ORDER — HYDROXYZINE HYDROCHLORIDE 25 MG/1
25 TABLET, FILM COATED ORAL NIGHTLY
Qty: 90 TABLET | Refills: 0 | Status: SHIPPED | OUTPATIENT
Start: 2025-01-21

## 2025-04-11 ENCOUNTER — TELEPHONE (OUTPATIENT)
Dept: FAMILY MEDICINE CLINIC | Facility: CLINIC | Age: 80
End: 2025-04-11

## 2025-04-11 DIAGNOSIS — E55.9 VITAMIN D DEFICIENCY: ICD-10-CM

## 2025-04-11 DIAGNOSIS — E78.2 MIXED HYPERLIPIDEMIA: ICD-10-CM

## 2025-04-11 DIAGNOSIS — E11.9 TYPE 2 DIABETES MELLITUS WITHOUT COMPLICATION, WITHOUT LONG-TERM CURRENT USE OF INSULIN (HCC): Primary | ICD-10-CM

## 2025-04-11 NOTE — TELEPHONE ENCOUNTER
Jessica Fine requesting annual wellness labs be ordered for Dr. Ahsan Loving prior to appointment.     Future Appointments   Date Time Provider Department Center   7/1/2025 10:30 AM Ahsan Loving MD [facility] [place]

## 2025-04-22 RX ORDER — HYDROXYZINE HYDROCHLORIDE 25 MG/1
25 TABLET, FILM COATED ORAL NIGHTLY
Qty: 90 TABLET | Refills: 0 | Status: SHIPPED | OUTPATIENT
Start: 2025-04-22

## 2025-04-22 NOTE — TELEPHONE ENCOUNTER
Requested Prescriptions     Pending Prescriptions Disp Refills    HYDROXYZINE 25 MG Oral Tab [Pharmacy Med Name: HYDROXYZINE HCL 25MG TABS (WHITE)] 90 tablet 0     Sig: TAKE 1 TABLET(25 MG) BY MOUTH EVERY NIGHT       Last Refill: 1/21    Last OV: 7/29/24    Next OV: 7/1

## 2025-04-28 RX ORDER — AMLODIPINE BESYLATE 2.5 MG/1
2.5 TABLET ORAL DAILY
Qty: 90 TABLET | Refills: 0 | Status: SHIPPED | OUTPATIENT
Start: 2025-04-28

## 2025-07-28 RX ORDER — SITAGLIPTIN 100 MG/1
100 TABLET, FILM COATED ORAL DAILY
Qty: 90 TABLET | Refills: 1 | OUTPATIENT
Start: 2025-07-28

## 2025-07-28 RX ORDER — HYDROXYZINE HYDROCHLORIDE 25 MG/1
25 TABLET, FILM COATED ORAL NIGHTLY
Qty: 90 TABLET | Refills: 0 | OUTPATIENT
Start: 2025-07-28

## 2025-07-31 RX ORDER — ROSUVASTATIN CALCIUM 10 MG/1
10 TABLET, COATED ORAL NIGHTLY
Qty: 90 TABLET | Refills: 1 | Status: SHIPPED | OUTPATIENT
Start: 2025-07-31

## 2025-07-31 RX ORDER — AMLODIPINE BESYLATE 2.5 MG/1
2.5 TABLET ORAL DAILY
Qty: 90 TABLET | Refills: 0 | Status: SHIPPED | OUTPATIENT
Start: 2025-07-31

## (undated) DEVICE — 3M™ RED DOT™ MONITORING ELECTRODE WITH FOAM TAPE AND STICKY GEL, 50/BAG, 20/CASE, 72/PLT 2570: Brand: RED DOT™

## (undated) DEVICE — KIT ENDO ORCAPOD 160/180/190

## (undated) DEVICE — STERIS KITS

## (undated) DEVICE — VALVE KIT SGL USE DEFENDO

## (undated) DEVICE — 1200CC GUARDIAN II: Brand: GUARDIAN

## (undated) DEVICE — REM POLYHESIVE ADULT PATIENT RETURN ELECTRODE: Brand: VALLEYLAB

## (undated) DEVICE — BLOCK BITE MAXI 60FR

## (undated) DEVICE — REPLAY HEMOSTASIS CLIP, 11MM SPAN: Brand: REPLAY

## (undated) DEVICE — LASSO POLYPECTOMY SNARE: Brand: LASSO

## (undated) DEVICE — ENDOSCOPY PACK - LOWER: Brand: MEDLINE INDUSTRIES, INC.

## (undated) DEVICE — TRAP SPEC REMOVAL ETRAP 15CM

## (undated) DEVICE — SNARE CAPTI HEX STIFF SMALL

## (undated) DEVICE — BIOGUARD CLEANING ADAPTER

## (undated) DEVICE — 10FT COMBINED O2 DELIVERY/CO2 MONITORING. FILTER WITH MICROSTREAM TYPE LUER: Brand: DUAL ADULT NASAL CANNULA

## (undated) NOTE — MR AVS SNAPSHOT
[facility]  [phone]               Thank you for choosing us for your health care visit with Chen Soares MD.  We are glad to serve you and happy to provide you with this gerard You can access your MyChart to more actively manage your health care and view more details from this visit by going to [email or web address].   If you've recently had a stay at the Hospital you can access your discharge instructions in [address] by danelle

## (undated) NOTE — LETTER
Patient Name: Ava Myers  Surgery Date: 2023  Medical Record: [id number] CSN: [id number]      Surgeon(s):  Ana Hutton MD  Consent Procedure: ESOPHAGOGASTRODUODENOSCOPY (EGD) and endoscopic ultrasound  Anesthesia Type: MAC    TO WHOM IT MAY CONCERN:    THE SON OF MR. MANUEL BORJAS    : 1/15/1945, WOULD LIKE THE GI PROCEDURE SCHEDULED ON 2023 TO BE MOVED UP TO AN EARLIER APPOINTMENT IF POSSIBLE. PATIENTS SON --MARJORIE, HAS CONCERNS WITH THE LONG FASTING D/T DIABETES DIAGNOSIS AND HIS AGE. PLEASE REACH OUT TO PATIENTS SON, [facility]  [phone].      [address] [place] [address]  [phone]

## (undated) NOTE — Clinical Note
Dear Dr. Keke La,       Thank you for referring Kristin Jack to the [facility].   Sincerely,  Deysi Jordan, AVE

## (undated) NOTE — MR AVS SNAPSHOT
[facility]  [address] [id number] [phone]               Thank you for choosing us for your health care visit with Rio Brown PA-C. We are glad to serve you and happy to provide you with this summary of your visit.

## (undated) NOTE — LETTER
09/25/18        Dear Joann Huynh and wellness is an important focus of our [place] [address] office.  One of your covered Medicare benefits is your annual Medicare wellness visit that we find valuable in maintaining your overall health and well-be

## (undated) NOTE — LETTER
OSR/GERMÁN Notification    Patient Name: Humberto Escamilla / Sex: 1/15/1945-A: 77 y  male  Medical Records: [id number] CSN: [id number]    Surgeon(s):  Surgeon(s):  Robbie Fuentes MD   Procedure: ESOPHAGOGASTRODUODENOSCOPY (EGD), COLONOSCOPY    Anesthesia Type: MAC Surgery Date: 7/18/2023    During the pre-operative screening process using the STOP-Bang questionnaire, the patient listed above was identified as being at high risk for obstructive sleep apnea. If you feel it is appropriate to schedule a sleep study, the [facility] will give priority to patients scheduled for procedures to minimize surgical delays. If a sleep study is completed prior to surgery, please fax the results/plan of care to Kat Seals  ([phone]) as this information will be utilized in clinical decision-making regarding the patient's upcoming surgery. Thank you for your assistance in helping us provide a safe, seamless and personal experience for your patient.     MD Josse Hernandzean [place], Department of Anesthesia

## (undated) NOTE — LETTER
Date: 2023  Patient Name:  Carolin Brody             Address: [address] [place] [address]. [place] [address]    : 1/15/1945    Jose A Lopez,     Your recent blood work shows normal liver enzymes, and a normal red blood cell count. Your blood sugar is high, I recommend you follow-up with your primary care physician for further evaluation of this. Please let me know if you have any questions or concerns.       Sincerely,  Laurence Schmitz MD

## (undated) NOTE — LETTER
[facility] [facility]    Consent for Coronary CT Angiography    Your doctor has recommended that you have a Coronary CT Angiography procedure.  Coronary CT Angiography is a diagnostic procedure using computed tomography to scan the can range from very minor to very serious leading to a life threatening situation or even death. Please be sure to communicate any allergy you may have to your caregiver immediately.     The information that is obtained during your testing will be treated a

## (undated) NOTE — MR AVS SNAPSHOT
[facility]  [address] [place]  [phone]               Thank you for choosing us for your health care visit with Dante Figueroa NP. We are glad to serve you and happy to provide you with this summary of your visit.   Ple · An expectorant containing guaifenesin may help thin the mucus and promote drainage from the sinuses. · Over-the-counter decongestants may be used unless a similar medicine was prescribed.  Nasal sprays work the fastest. Use one that contains phenylephrin © 1924-0604 [address], [address] [place]. All rights reserved. This information is not intended as a substitute for professional medical care. Always follow your healthcare professional's instructions.              Al - azithromycin 250 MG Tabs            MyChart     Visit [facility]  You can access your MyChart to more actively manage your health care and view more details from this visit by going to [email or web address].   If you've recently had a stay at the [facility]